# Patient Record
Sex: MALE | Race: WHITE | Employment: OTHER | URBAN - METROPOLITAN AREA
[De-identification: names, ages, dates, MRNs, and addresses within clinical notes are randomized per-mention and may not be internally consistent; named-entity substitution may affect disease eponyms.]

---

## 2020-12-27 ENCOUNTER — APPOINTMENT (OUTPATIENT)
Dept: ULTRASOUND IMAGING | Age: 50
DRG: 446 | End: 2020-12-27
Payer: OTHER GOVERNMENT

## 2020-12-27 ENCOUNTER — HOSPITAL ENCOUNTER (INPATIENT)
Age: 50
LOS: 6 days | Discharge: HOME | DRG: 446 | End: 2021-01-02
Attending: EMERGENCY MEDICINE | Admitting: INTERNAL MEDICINE
Payer: OTHER GOVERNMENT

## 2020-12-27 ENCOUNTER — APPOINTMENT (OUTPATIENT)
Dept: GENERAL RADIOLOGY | Age: 50
DRG: 446 | End: 2020-12-27
Payer: OTHER GOVERNMENT

## 2020-12-27 ENCOUNTER — APPOINTMENT (OUTPATIENT)
Dept: CT IMAGING | Age: 50
DRG: 446 | End: 2020-12-27
Payer: OTHER GOVERNMENT

## 2020-12-27 DIAGNOSIS — R11.0 NAUSEA: ICD-10-CM

## 2020-12-27 DIAGNOSIS — K80.00 CALCULUS OF GALLBLADDER WITH ACUTE CHOLECYSTITIS WITHOUT OBSTRUCTION: Primary | ICD-10-CM

## 2020-12-27 DIAGNOSIS — R74.01 TRANSAMINITIS: ICD-10-CM

## 2020-12-27 DIAGNOSIS — R10.13 ABDOMINAL PAIN, EPIGASTRIC: ICD-10-CM

## 2020-12-27 LAB
ALBUMIN SERPL-MCNC: 3.9 G/DL (ref 3.4–5)
ALP BLD-CCNC: 324 U/L (ref 40–129)
ALT SERPL-CCNC: 966 U/L (ref 10–40)
ANION GAP SERPL CALCULATED.3IONS-SCNC: 9 MMOL/L (ref 3–16)
AST SERPL-CCNC: 576 U/L (ref 15–37)
BASOPHILS ABSOLUTE: 0.1 K/UL (ref 0–0.2)
BASOPHILS RELATIVE PERCENT: 1.7 %
BILIRUB SERPL-MCNC: 1.6 MG/DL (ref 0–1)
BILIRUBIN DIRECT: 0.9 MG/DL (ref 0–0.3)
BILIRUBIN URINE: NEGATIVE
BILIRUBIN, INDIRECT: 0.7 MG/DL (ref 0–1)
BLOOD, URINE: NEGATIVE
BUN BLDV-MCNC: 13 MG/DL (ref 7–20)
CALCIUM SERPL-MCNC: 8.5 MG/DL (ref 8.3–10.6)
CHLORIDE BLD-SCNC: 99 MMOL/L (ref 99–110)
CLARITY: CLEAR
CO2: 26 MMOL/L (ref 21–32)
COLOR: YELLOW
CREAT SERPL-MCNC: 1 MG/DL (ref 0.9–1.3)
EOSINOPHILS ABSOLUTE: 0.6 K/UL (ref 0–0.6)
EOSINOPHILS RELATIVE PERCENT: 9.9 %
GFR AFRICAN AMERICAN: >60
GFR NON-AFRICAN AMERICAN: >60
GLUCOSE BLD-MCNC: 120 MG/DL (ref 70–99)
GLUCOSE BLD-MCNC: 219 MG/DL (ref 70–99)
GLUCOSE URINE: >=1000 MG/DL
HCT VFR BLD CALC: 39.1 % (ref 40.5–52.5)
HEMOGLOBIN: 13 G/DL (ref 13.5–17.5)
INR BLD: 1.12 (ref 0.86–1.14)
KETONES, URINE: NEGATIVE MG/DL
LEUKOCYTE ESTERASE, URINE: NEGATIVE
LIPASE: 69 U/L (ref 13–60)
LYMPHOCYTES ABSOLUTE: 1.8 K/UL (ref 1–5.1)
LYMPHOCYTES RELATIVE PERCENT: 29.7 %
MCH RBC QN AUTO: 30.1 PG (ref 26–34)
MCHC RBC AUTO-ENTMCNC: 33.3 G/DL (ref 31–36)
MCV RBC AUTO: 90.3 FL (ref 80–100)
MICROSCOPIC EXAMINATION: ABNORMAL
MONOCYTES ABSOLUTE: 0.5 K/UL (ref 0–1.3)
MONOCYTES RELATIVE PERCENT: 8.2 %
NEUTROPHILS ABSOLUTE: 3 K/UL (ref 1.7–7.7)
NEUTROPHILS RELATIVE PERCENT: 50.5 %
NITRITE, URINE: NEGATIVE
PDW BLD-RTO: 14.7 % (ref 12.4–15.4)
PERFORMED ON: ABNORMAL
PH UA: 6.5 (ref 5–8)
PLATELET # BLD: 331 K/UL (ref 135–450)
PMV BLD AUTO: 8.1 FL (ref 5–10.5)
POTASSIUM REFLEX MAGNESIUM: 4.1 MMOL/L (ref 3.5–5.1)
PROTEIN UA: NEGATIVE MG/DL
PROTHROMBIN TIME: 13 SEC (ref 10–13.2)
RBC # BLD: 4.33 M/UL (ref 4.2–5.9)
SARS-COV-2, NAAT: NOT DETECTED
SODIUM BLD-SCNC: 134 MMOL/L (ref 136–145)
SPECIFIC GRAVITY UA: 1.01 (ref 1–1.03)
TOTAL PROTEIN: 7.1 G/DL (ref 6.4–8.2)
TROPONIN: <0.01 NG/ML
URINE REFLEX TO CULTURE: ABNORMAL
URINE TYPE: ABNORMAL
UROBILINOGEN, URINE: 1 E.U./DL
WBC # BLD: 6 K/UL (ref 4–11)

## 2020-12-27 PROCEDURE — 81003 URINALYSIS AUTO W/O SCOPE: CPT

## 2020-12-27 PROCEDURE — 96376 TX/PRO/DX INJ SAME DRUG ADON: CPT

## 2020-12-27 PROCEDURE — 6360000002 HC RX W HCPCS: Performed by: NURSE PRACTITIONER

## 2020-12-27 PROCEDURE — 85025 COMPLETE CBC W/AUTO DIFF WBC: CPT

## 2020-12-27 PROCEDURE — 6360000002 HC RX W HCPCS: Performed by: PHYSICIAN ASSISTANT

## 2020-12-27 PROCEDURE — 96365 THER/PROPH/DIAG IV INF INIT: CPT

## 2020-12-27 PROCEDURE — 74177 CT ABD & PELVIS W/CONTRAST: CPT

## 2020-12-27 PROCEDURE — 76705 ECHO EXAM OF ABDOMEN: CPT

## 2020-12-27 PROCEDURE — 2580000003 HC RX 258: Performed by: PHYSICIAN ASSISTANT

## 2020-12-27 PROCEDURE — 93005 ELECTROCARDIOGRAM TRACING: CPT | Performed by: PHYSICIAN ASSISTANT

## 2020-12-27 PROCEDURE — 2580000003 HC RX 258: Performed by: NURSE PRACTITIONER

## 2020-12-27 PROCEDURE — 96375 TX/PRO/DX INJ NEW DRUG ADDON: CPT

## 2020-12-27 PROCEDURE — 85610 PROTHROMBIN TIME: CPT

## 2020-12-27 PROCEDURE — 84439 ASSAY OF FREE THYROXINE: CPT

## 2020-12-27 PROCEDURE — 6360000004 HC RX CONTRAST MEDICATION: Performed by: PHYSICIAN ASSISTANT

## 2020-12-27 PROCEDURE — U0002 COVID-19 LAB TEST NON-CDC: HCPCS

## 2020-12-27 PROCEDURE — 84443 ASSAY THYROID STIM HORMONE: CPT

## 2020-12-27 PROCEDURE — 74018 RADEX ABDOMEN 1 VIEW: CPT

## 2020-12-27 PROCEDURE — 99221 1ST HOSP IP/OBS SF/LOW 40: CPT | Performed by: SURGERY

## 2020-12-27 PROCEDURE — 1200000000 HC SEMI PRIVATE

## 2020-12-27 PROCEDURE — 83690 ASSAY OF LIPASE: CPT

## 2020-12-27 PROCEDURE — 80076 HEPATIC FUNCTION PANEL: CPT

## 2020-12-27 PROCEDURE — 99285 EMERGENCY DEPT VISIT HI MDM: CPT

## 2020-12-27 PROCEDURE — 80048 BASIC METABOLIC PNL TOTAL CA: CPT

## 2020-12-27 PROCEDURE — 84484 ASSAY OF TROPONIN QUANT: CPT

## 2020-12-27 RX ORDER — ONDANSETRON 2 MG/ML
4 INJECTION INTRAMUSCULAR; INTRAVENOUS ONCE
Status: COMPLETED | OUTPATIENT
Start: 2020-12-27 | End: 2020-12-27

## 2020-12-27 RX ORDER — SODIUM CHLORIDE 9 MG/ML
INJECTION, SOLUTION INTRAVENOUS CONTINUOUS
Status: DISCONTINUED | OUTPATIENT
Start: 2020-12-27 | End: 2021-01-03 | Stop reason: HOSPADM

## 2020-12-27 RX ORDER — LOSARTAN POTASSIUM 25 MG/1
75 TABLET ORAL DAILY
Status: DISCONTINUED | OUTPATIENT
Start: 2020-12-28 | End: 2021-01-03 | Stop reason: HOSPADM

## 2020-12-27 RX ORDER — NICOTINE POLACRILEX 4 MG
15 LOZENGE BUCCAL PRN
Status: DISCONTINUED | OUTPATIENT
Start: 2020-12-27 | End: 2021-01-03 | Stop reason: HOSPADM

## 2020-12-27 RX ORDER — LOSARTAN POTASSIUM 25 MG/1
75 TABLET ORAL DAILY
COMMUNITY

## 2020-12-27 RX ORDER — LEVOTHYROXINE SODIUM 0.03 MG/1
25 TABLET ORAL
Status: DISCONTINUED | OUTPATIENT
Start: 2020-12-28 | End: 2021-01-03 | Stop reason: HOSPADM

## 2020-12-27 RX ORDER — KETOROLAC TROMETHAMINE 30 MG/ML
30 INJECTION, SOLUTION INTRAMUSCULAR; INTRAVENOUS ONCE
Status: COMPLETED | OUTPATIENT
Start: 2020-12-27 | End: 2020-12-27

## 2020-12-27 RX ORDER — 0.9 % SODIUM CHLORIDE 0.9 %
1000 INTRAVENOUS SOLUTION INTRAVENOUS ONCE
Status: COMPLETED | OUTPATIENT
Start: 2020-12-27 | End: 2020-12-27

## 2020-12-27 RX ORDER — FLUOXETINE HYDROCHLORIDE 20 MG/1
20 CAPSULE ORAL 2 TIMES DAILY
Status: DISCONTINUED | OUTPATIENT
Start: 2020-12-27 | End: 2021-01-03 | Stop reason: HOSPADM

## 2020-12-27 RX ORDER — ATORVASTATIN CALCIUM 80 MG/1
120 TABLET, FILM COATED ORAL DAILY
Status: ON HOLD | COMMUNITY
End: 2021-01-02 | Stop reason: HOSPADM

## 2020-12-27 RX ORDER — ONDANSETRON 2 MG/ML
4 INJECTION INTRAMUSCULAR; INTRAVENOUS EVERY 6 HOURS PRN
Status: DISCONTINUED | OUTPATIENT
Start: 2020-12-27 | End: 2021-01-03 | Stop reason: HOSPADM

## 2020-12-27 RX ORDER — ACETAMINOPHEN 325 MG/1
650 TABLET ORAL EVERY 4 HOURS PRN
Status: DISCONTINUED | OUTPATIENT
Start: 2020-12-27 | End: 2021-01-03 | Stop reason: HOSPADM

## 2020-12-27 RX ORDER — DEXTROSE MONOHYDRATE 50 MG/ML
100 INJECTION, SOLUTION INTRAVENOUS PRN
Status: DISCONTINUED | OUTPATIENT
Start: 2020-12-27 | End: 2021-01-03 | Stop reason: HOSPADM

## 2020-12-27 RX ORDER — SODIUM CHLORIDE 0.9 % (FLUSH) 0.9 %
10 SYRINGE (ML) INJECTION EVERY 12 HOURS SCHEDULED
Status: DISCONTINUED | OUTPATIENT
Start: 2020-12-27 | End: 2021-01-03 | Stop reason: HOSPADM

## 2020-12-27 RX ORDER — SODIUM CHLORIDE 0.9 % (FLUSH) 0.9 %
10 SYRINGE (ML) INJECTION PRN
Status: DISCONTINUED | OUTPATIENT
Start: 2020-12-27 | End: 2021-01-03 | Stop reason: HOSPADM

## 2020-12-27 RX ORDER — PROMETHAZINE HYDROCHLORIDE 25 MG/1
12.5 TABLET ORAL EVERY 6 HOURS PRN
Status: DISCONTINUED | OUTPATIENT
Start: 2020-12-27 | End: 2021-01-03 | Stop reason: HOSPADM

## 2020-12-27 RX ORDER — MORPHINE SULFATE 2 MG/ML
2 INJECTION, SOLUTION INTRAMUSCULAR; INTRAVENOUS
Status: DISCONTINUED | OUTPATIENT
Start: 2020-12-27 | End: 2021-01-01

## 2020-12-27 RX ORDER — FLUOXETINE HYDROCHLORIDE 20 MG/1
20 CAPSULE ORAL 2 TIMES DAILY
COMMUNITY
End: 2021-01-22

## 2020-12-27 RX ORDER — DEXTROSE MONOHYDRATE 25 G/50ML
12.5 INJECTION, SOLUTION INTRAVENOUS PRN
Status: DISCONTINUED | OUTPATIENT
Start: 2020-12-27 | End: 2021-01-03 | Stop reason: HOSPADM

## 2020-12-27 RX ORDER — MORPHINE SULFATE 4 MG/ML
4 INJECTION, SOLUTION INTRAMUSCULAR; INTRAVENOUS ONCE
Status: COMPLETED | OUTPATIENT
Start: 2020-12-27 | End: 2020-12-27

## 2020-12-27 RX ADMIN — ONDANSETRON 4 MG: 2 INJECTION INTRAMUSCULAR; INTRAVENOUS at 18:53

## 2020-12-27 RX ADMIN — Medication 10 ML: at 23:02

## 2020-12-27 RX ADMIN — ONDANSETRON 4 MG: 2 INJECTION INTRAMUSCULAR; INTRAVENOUS at 16:03

## 2020-12-27 RX ADMIN — MORPHINE SULFATE 2 MG: 2 INJECTION, SOLUTION INTRAMUSCULAR; INTRAVENOUS at 23:03

## 2020-12-27 RX ADMIN — SODIUM CHLORIDE 1000 ML: 9 INJECTION, SOLUTION INTRAVENOUS at 16:03

## 2020-12-27 RX ADMIN — SODIUM CHLORIDE 1000 ML: 9 INJECTION, SOLUTION INTRAVENOUS at 18:44

## 2020-12-27 RX ADMIN — PIPERACILLIN AND TAZOBACTAM 3.38 G: 3; .375 INJECTION, POWDER, FOR SOLUTION INTRAVENOUS at 18:43

## 2020-12-27 RX ADMIN — SODIUM CHLORIDE: 900 INJECTION INTRAVENOUS at 23:02

## 2020-12-27 RX ADMIN — MORPHINE SULFATE 4 MG: 4 INJECTION, SOLUTION INTRAMUSCULAR; INTRAVENOUS at 18:53

## 2020-12-27 RX ADMIN — KETOROLAC TROMETHAMINE 30 MG: 30 INJECTION, SOLUTION INTRAMUSCULAR at 16:03

## 2020-12-27 RX ADMIN — IOPAMIDOL 75 ML: 755 INJECTION, SOLUTION INTRAVENOUS at 17:37

## 2020-12-27 ASSESSMENT — PAIN DESCRIPTION - PAIN TYPE: TYPE: ACUTE PAIN

## 2020-12-27 ASSESSMENT — PAIN SCALES - GENERAL
PAINLEVEL_OUTOF10: 4
PAINLEVEL_OUTOF10: 4
PAINLEVEL_OUTOF10: 8
PAINLEVEL_OUTOF10: 8

## 2020-12-27 ASSESSMENT — PAIN DESCRIPTION - LOCATION: LOCATION: ABDOMEN

## 2020-12-27 NOTE — ED PROVIDER NOTES
[address] [facility]  ED  EMERGENCY DEPARTMENT ENCOUNTER        Pt Name: Ledy Mcclellan  MRN: [id number]  [place] 1970  Date of evaluation: 12/27/2020  Provider: COLTON Mehta CNP  PCP: No primary care provider on file. I have seen and evaluated this patient with my supervising physician Matias Mancuso MD.    [address] [place] [address]       Chief Complaint   Patient presents with    Abdominal Pain     seen at urgent care in [facility], states he was sent to the [facility]. Had CT scan done. They wanted to admit patient, but he didn't want to be admitted over [place]. CT scan showed:  bilateral hydronephrosis, distended bladder, cholelithiasis, constipation, etc.     Nausea       1700 Pt care transferred to me from Gary Berg, [address] [place], [place] [address]nabeel Mckeon. Pending labs and ct scan.        DIAGNOSTIC RESULTS   LABS:    Labs Reviewed   CBC WITH AUTO DIFFERENTIAL - Abnormal; Notable for the following components:       Result Value    Hemoglobin 13.0 (*)     Hematocrit 39.1 (*)     All other components within normal limits    Narrative:     Performed at:  [place] [address] [address]   Phone [phone]   BASIC METABOLIC PANEL W/ REFLEX TO MG FOR LOW K - Abnormal; Notable for the following components:    Sodium 134 (*)     Glucose 219 (*)     All other components within normal limits    Narrative:     Performed at:  [place] [address] [address]   Phone [phone]   HEPATIC FUNCTION PANEL - Abnormal; Notable for the following components:    Alkaline Phosphatase 324 (*)      (*)      (*)     Total Bilirubin 1.6 (*)     Bilirubin, Direct 0.9 (*)     All other components within normal limits    Narrative:     Performed at:  [place] [address] [address]   Phone [phone]   LIPASE - Abnormal; Notable for the following components:    Lipase 69.0 (*)     All other components within normal limits    Narrative:     Performed at:  [address],  [place], [address]   Phone [phone]   URINE RT REFLEX TO CULTURE - Abnormal; Notable for the following components:    Glucose, Ur >=1000 (*)     All other components within normal limits    Narrative:     Performed at:  [address],  [place], [address]   Phone [phone]   TROPONIN    Narrative:     Performed at:  [address],  [place], [address]   Phone [phone]   PROTIME-INR    Narrative:     Performed at:  [address],  [place], [address]   Phone [phone]   TSH WITH REFLEX       All other labs were within normal range or not returned as of this dictation. EKG: All EKG's are interpreted by the Emergency Department Physician in the absence of a cardiologist.  Please see their note for interpretation of EKG. RADIOLOGY:   Non-plain film images such as CT, Ultrasound and MRI are read by the radiologist. Plain radiographic images are visualized and preliminarily interpreted by the ED Provider with the below findings:        Interpretation per the Radiologist below, if available at the time of this note:    [address]   Final Result   Calculus cholecystitis         CT ABDOMEN PELVIS W IV CONTRAST Additional Contrast? None   Final Result   1. Cholelithiasis with cholecystitis   2. Appearance of the urinary bladder may be due to cystitis and/or chronic   bladder outlet obstruction. Correlate with urinalysis         XR ABDOMEN (KUB) (SINGLE AP VIEW)   Final Result   Moderate retained colonic stool as outlined above. No small bowel distension.            Xr Abdomen (kub) (single Ap View)    Result Date: 12/27/2020  EXAMINATION: ONE SUPINE XRAY VIEW(S) OF THE Imaging studies reviewed. (See chart for details)   -  Patient seen and evaluated in the emergency department. -  Triage and nursing notes reviewed and incorporated. -  Old chart records reviewed and incorporated. -  Patient case discussed with attending physician,  Dr. Josy Flaherty. They saw and examined patient. -  Differential diagnosis includes:  Cholecystitis, pancreatitis, cholelithiasis, choledocholithiasis, UTI, pyelonephritis, cystitis, hepatitis, cirrhosis, versus COVID-19  -  Work-up included:  See above CBC, BMP, hepatic function panel, lipase, troponin, INR, UA, TSH, x-ray KUB, CT abdomen pelvis with IV contrast, ultrasound right upper quadrant gallbladder, EKG  -  ED treatment included:  normal Saline, Toradol, Zofran, morphine, zosyn  - Consults: General surgery, Dr. Holland Sunshine, who request patient to be started on IV antibiotics in the ED and to be admitted to the hospitalist as he would also need urology consult. Hospitalist Edwin  -  Results discussed with patient. Labs show  CBC with hemoglobin 13, hematocrit 39.1. BMP with sodium 134, glucose 219. Hepatic function panel with alk phos 324, , , total bili 1.6, bili direct 0.9. Lipase 69. Troponin negative. INR 1.12, pro time 13. X-ray KUB shows moderate retained colonic stool as outlined above. No small bowel distention. UA with greater than thousand glucose. CT abdomen pelvis with IV contrast shows cholelithiasis with cholecystitis. appreanace of the urinary bladder may be due to cystitis and/or chronic bladder outlet obstruction. Correlate with urinalysis. The patient is agreeable with plan of care and disposition  -  Disposition:   Admission    CRITICAL CARE TIME   Total Critical Care time was 35 minutes, excluding separately reportable procedures. There was a high probability of clinically significant/life threatening deterioration in the patient's condition which required my urgent intervention.        FINAL IMPRESSION      1. Calculus of gallbladder with acute cholecystitis without obstruction    2. Transaminitis    3. Nausea    4.  Abdominal pain, epigastric          DISPOSITION/PLAN   DISPOSITION    Admission         (Please note that portions of this note were completed with a voice recognition program.  Efforts were made to edit the dictations but occasionally words are mis-transcribed.)    COLTON Lynn CNP (electronically signed)            COLTON yLnn CNP  12/27/20 2010

## 2020-12-27 NOTE — ED PROVIDER NOTES
EKG interpretation:    Normal sinus rhythm, rate 64, no acute ST changes or T wave inversions. Leftward axis, QTC mildly prolonged. No priors available for comparison. Cayla Bruno MD  12/27/20 0292    I independently performed a history and physical on Sigrid Adams. All diagnostic, treatment, and disposition decisions were made by myself in conjunction with the advanced practice provider. Patient with right upper quadrant symptoms for over 5 days. Was seen at another facility and admission was recommended however he declined to stay because he wanted to be home for [place]. Patient is now jaundiced and has elevation of his LFTs. Imaging here demonstrates cholelithiasis with cholecystitis. Gallbladder ultrasound pending. Plan for admission and antibiotics. For further details of [facility] emergency department encounter, please see ARUNA Peres's documentation.      Cayla Bruno MD  12/27/20 4313

## 2020-12-27 NOTE — ED PROVIDER NOTES
[address]        Pt Name: Rebekah Meneses  MRN: [id number]  [place] 1970  Date of evaluation: 12/27/2020  Provider: Rodrick Ndiaye PA-C  PCP: No primary care provider on file. HIRAL. I have evaluated this patient. My supervising physician was available for consultation. Loretta Gaxiola MD      CHIEF COMPLAINT       Chief Complaint   Patient presents with    Abdominal Pain     seen at urgent care in [facility], states he was sent to the [facility]. Had CT scan done. They wanted to admit patient, but he didn't want to be admitted over [place]. CT scan showed:  bilateral hydronephrosis, distended bladder, cholelithiasis, constipation, etc.     Nausea       HISTORY OF PRESENT ILLNESS   (Location, Timing/Onset, Context/Setting, Quality, Duration, Modifying Factors, Severity, Associated Signs and Symptoms)  Note limiting factors. Rebekah Meneses is a 52 y.o. male patient presenting with complaint epigastric and right upper quadrant abdominal pain. Onset 3 months ago with steady progression. Patient reports constipated feeling any self-administered magnesium citrate yesterday with a BM as well occurring. The patient was seen at [facility] ER facility on December 23, 2020. At that time he had same complaint as noted above. He underwent CT imaging of the abdomen pelvis. It does result indicating moderate-advanced bilateral hydronephrosis and hydroureter. Distended thick-walled bladder. Cholelithiasis. Fatty liver infiltrate or other hepatocellular disease. Stranding and fluid near the pancreatic head and gallbladder fossa. Suspect potential pancreatitis or gallbladder pathology or hepatitis/hepatocellular disease. Constipation noted. The patient also reports having been seen at [place] [address] and they did not come up with a specific or working diagnosis.     The patient comes in today as they had wanted him admitted to the [facility] facility and he had left and is come out to this facility for evaluation and admission if needed. Nursing Notes were all reviewed and agreed with or any disagreements were addressed in the HPI. REVIEW OF SYSTEMS    (2-9 systems for level 4, 10 or more for level 5)     Review of Systems    Positives and Pertinent negatives as per HPI. Except as noted above in the ROS, all other systems were reviewed and negative. PAST MEDICAL HISTORY     Past Medical History:   Diagnosis Date    Diabetes mellitus ([facility] [address]     Hypercholesteremia     Hypertension     Thyroid cancer ([facility]          SURGICAL HISTORY     Past Surgical History:   Procedure Laterality Date    APPENDECTOMY      TOTAL THYROIDECTOMY  2017         [address]       Current Discharge Medication List      CONTINUE these medications which have NOT CHANGED    Details   metFORMIN (GLUCOPHAGE) 500 MG tablet Take 1,000 mg by mouth 2 times daily (with meals)      Levothyroxine Sodium (SYNTHROID PO) Take 25 mcg by mouth every morning (before breakfast)      losartan (COZAAR) 25 MG tablet Take 75 mg by mouth daily      FLUoxetine (PROZAC) 20 MG capsule Take 20 mg by mouth 2 times daily      atorvastatin (LIPITOR) 80 MG tablet Take 120 mg by mouth daily               ALLERGIES     Patient has no known allergies. FAMILYHISTORY     History reviewed. No pertinent family history.        SOCIAL HISTORY       Social History     Tobacco Use    Smoking status: Former Smoker    Smokeless tobacco: Never Used    Tobacco comment: QUITE 15 YEARS AGO   Substance Use Topics    Alcohol use: Not Currently     Comment: quit 6 years ago    Drug use: Never       SCREENINGS    [place] Coma Scale  Eye Opening: Spontaneous  Best Verbal Response: Oriented  Best Motor Response: Obeys commands  Eliud Coma Scale Score: 15        PHYSICAL EXAM    (up to 7 for level 4, 8 or more for level 5)     ED Triage Vitals   BP Temp Temp Source Pulse Resp SpO2 Height Weight   12/27/20 1504 12/27/20 1504 12/27/20 1504 12/27/20 1500 12/27/20 1504 12/27/20 1500 12/27/20 1504 12/27/20 1504   (!) 148/104 96.5 °F (35.8 °C) Temporal 89 16 99 % 5' 8\" (1.727 m) 185 lb (83.9 kg)       Physical Exam  Vitals signs and nursing note reviewed. Constitutional:       Appearance: Normal appearance. He is well-developed and normal weight. HENT:      Head: Normocephalic and atraumatic. Right Ear: External ear normal.      Left Ear: External ear normal.   Eyes:      General: No scleral icterus. Right eye: No discharge. Left eye: No discharge. Conjunctiva/sclera: Conjunctivae normal.   Neck:      Musculoskeletal: Normal range of motion and neck supple. Cardiovascular:      Rate and Rhythm: Normal rate and regular rhythm. Heart sounds: Normal heart sounds. Pulmonary:      Effort: Pulmonary effort is normal.      Breath sounds: Normal breath sounds. Abdominal:      General: Abdomen is flat. Bowel sounds are normal.      Palpations: Abdomen is soft. Tenderness: There is abdominal tenderness. There is no right CVA tenderness or left CVA tenderness. Comments: The patient with tenderness epigastric and right upper quadrant. Negative Foote's. No rebound, mass or guarding. The abdomen is soft. No CVA tenderness. Musculoskeletal: Normal range of motion. Right lower leg: No edema. Left lower leg: No edema. Skin:     General: Skin is warm and dry. Neurological:      General: No focal deficit present. Mental Status: He is alert and oriented to person, place, and time. Mental status is at baseline. Psychiatric:         Mood and Affect: Mood normal.         Behavior: Behavior normal.         Thought Content:  Thought content normal.         Judgment: Judgment normal.         DIAGNOSTIC RESULTS   LABS:    Labs Reviewed   CBC WITH AUTO DIFFERENTIAL - Abnormal; Notable for the following components:       Result Value    Hemoglobin 13.0 (*) components:    RBC 4.18 (*)     Hemoglobin 12.6 (*)     Hematocrit 37.6 (*)     All other components within normal limits    Narrative:     Performed at:  [address], [place]   Phone [phone]   POCT GLUCOSE - Abnormal; Notable for the following components:    POC Glucose 120 (*)     All other components within normal limits    Narrative:     Performed at:  [address], [place]   Phone [phone]   TROPONIN    Narrative:     Performed at:  [address], [facility] [place]   Phone [phone]   PROTIME-INR    Narrative:     Performed at:  [address], [place]   Phone [phone]    Narrative:     Performed at:  [address], [place]   Phone [phone]   LIPASE    Narrative:     Performed at:  [address], [place]   Phone [phone]   TSH WITH REFLEX   HEMOGLOBIN A1C   POCT GLUCOSE    Narrative:     Performed at:  [place] [address]   Phone [phone]   POCT GLUCOSE   POCT GLUCOSE   POCT GLUCOSE   POCT GLUCOSE       All other labs were within normal range or not returned as of this dictation. EKG: All EKG's are interpreted by the Emergency Department Physician in the absence of a cardiologist.  Please see their note for interpretation of EKG.       RADIOLOGY:   Non-plain film images such as CT, Ultrasound and MRI are read by the radiologist. Plain radiographic images are visualized and preliminarily interpreted by the ED Provider with the below findings:        Interpretation per the Radiologist below, if available at the time of this note:    [address] GALL[address]ADDER RUQ   Final Result   Calculus cholecystitis         CT ABDOMEN PELVIS W IV CONTRAST Additional Contrast? None   Final Result   1. Cholelithiasis with cholecystitis   2. Appearance of the urinary bladder may be due to cystitis and/or chronic   bladder outlet obstruction. Correlate with urinalysis         XR ABDOMEN (KUB) (SINGLE AP VIEW)   Final Result   Moderate retained colonic stool as outlined above. No small bowel distension. No results found.         PROCEDURES   Unless otherwise noted below, none     Procedures    CRITICAL CARE TIME   N/A    CONSULTS:  IP CONSULT TO GENERAL SURGERY  IP CONSULT TO HOSPITALIST  IP CONSULT TO GENERAL SURGERY  IP CONSULT TO GI      EMERGENCY DEPARTMENT COURSE and DIFFERENTIAL DIAGNOSIS/MDM:   Vitals:    Vitals:    12/27/20 1847 12/27/20 2200 12/28/20 0030 12/28/20 0800   BP: (!) 154/106 (!) 162/101 (!) 149/96 (!) 142/90   Pulse: 82 64 58 52   Resp: 18 16 16 16   Temp:  98.5 °F (36.9 °C) 98.7 °F (37.1 °C) 97.5 °F (36.4 °C)   TempSrc:  Oral Oral Oral   SpO2: 99% 98% 97% 96%   Weight:  189 lb 6.4 oz (85.9 kg)     Height:  5' 8\" (1.727 m)         Patient was given the following medications:  Medications   FLUoxetine (PROZAC) capsule 20 mg (20 mg Oral Not Given 12/27/20 2300)   levothyroxine (SYNTHROID) tablet 25 mcg (25 mcg Oral Given 12/28/20 0557)   losartan (COZAAR) tablet 75 mg (has no administration in time range)   0.9 % sodium chloride infusion ( Intravenous New Bag 12/27/20 2302)   sodium chloride flush 0.9 % injection 10 mL (10 mLs Intravenous Given 12/27/20 2302)   sodium chloride flush 0.9 % injection 10 mL (has no administration in time range)   acetaminophen (TYLENOL) tablet 650 mg (has no administration in time range)   promethazine (PHENERGAN) tablet 12.5 mg (has no administration in time range)     Or   ondansetron (ZOFRAN) injection 4 mg (has no administration in time range)   enoxaparin (LOVENOX) injection 40 mg (has no administration in time range)   morphine (PF) injection 2 mg (2 mg Intravenous Given 12/28/20 0557)   glucose (GLUTOSE) 40 % oral gel 15 g (has no administration in time range)   dextrose 50 % IV solution (has no administration in time range)   glucagon (rDNA) injection 1 mg (has no administration in time range)   dextrose 5 % solution (has no administration in time range)   insulin lispro (HUMALOG) injection vial 0-12 Units (has no administration in time range)   insulin lispro (HUMALOG) injection vial 0-6 Units (0 Units Subcutaneous Not Given 12/27/20 2257)   piperacillin-tazobactam (ZOSYN) 3.375 g in dextrose 5 % 50 mL IVPB (mini-bag) (0 g Intravenous Stopped 12/28/20 0628)   0.9 % sodium chloride bolus (0 mLs Intravenous Stopped 12/27/20 1703)   ondansetron (ZOFRAN) injection 4 mg (4 mg Intravenous Given 12/27/20 1603)   ketorolac (TORADOL) injection 30 mg (30 mg Intravenous Given 12/27/20 1603)   iopamidol (ISOVUE-370) 76 % injection 75 mL (75 mLs Intravenous Given 12/27/20 1737)   0.9 % sodium chloride bolus (0 mLs Intravenous Stopped 12/27/20 1944)   piperacillin-tazobactam (ZOSYN) 3.375 g in dextrose 5 % 50 mL IVPB (mini-bag) (0 g Intravenous Stopped 12/27/20 1913)   morphine (PF) injection 4 mg (4 mg Intravenous Given 12/27/20 1853)   ondansetron (ZOFRAN) injection 4 mg (4 mg Intravenous Given 12/27/20 1853)         5 PM I discussed case with coworker. She will manage case forward and final disposition. FINAL IMPRESSION      1. Calculus of gallbladder with acute cholecystitis without obstruction    2. Transaminitis    3. Nausea    4. Abdominal pain, epigastric          DISPOSITION/PLAN   DISPOSITION        PATIENT REFERREDTO:  No follow-up provider specified.     DISCHARGE MEDICATIONS:  Current Discharge Medication List          DISCONTINUED MEDICATIONS:  Current Discharge Medication List                 (Please note that portions of this note were completed with a voice recognition program.  Efforts were made to edit the dictations but occasionally words are mis-transcribed. )    Carolina Claros PA-C (electronically signed)           Carolina Claros PA-C  12/28/20 4281

## 2020-12-28 LAB
A/G RATIO: 1.2 (ref 1.1–2.2)
ALBUMIN SERPL-MCNC: 3.6 G/DL (ref 3.4–5)
ALP BLD-CCNC: 292 U/L (ref 40–129)
ALT SERPL-CCNC: 859 U/L (ref 10–40)
ANION GAP SERPL CALCULATED.3IONS-SCNC: 7 MMOL/L (ref 3–16)
AST SERPL-CCNC: 567 U/L (ref 15–37)
BASOPHILS ABSOLUTE: 0.1 K/UL (ref 0–0.2)
BASOPHILS RELATIVE PERCENT: 1.5 %
BILIRUB SERPL-MCNC: 1.6 MG/DL (ref 0–1)
BUN BLDV-MCNC: 11 MG/DL (ref 7–20)
CALCIUM SERPL-MCNC: 8.1 MG/DL (ref 8.3–10.6)
CHLORIDE BLD-SCNC: 103 MMOL/L (ref 99–110)
CO2: 28 MMOL/L (ref 21–32)
CREAT SERPL-MCNC: 1 MG/DL (ref 0.9–1.3)
EKG ATRIAL RATE: 64 BPM
EKG DIAGNOSIS: NORMAL
EKG P AXIS: 57 DEGREES
EKG P-R INTERVAL: 166 MS
EKG Q-T INTERVAL: 446 MS
EKG QRS DURATION: 104 MS
EKG QTC CALCULATION (BAZETT): 460 MS
EKG R AXIS: -16 DEGREES
EKG T AXIS: 39 DEGREES
EKG VENTRICULAR RATE: 64 BPM
EOSINOPHILS ABSOLUTE: 0.5 K/UL (ref 0–0.6)
EOSINOPHILS RELATIVE PERCENT: 7.5 %
GFR AFRICAN AMERICAN: >60
GFR NON-AFRICAN AMERICAN: >60
GLOBULIN: 3 G/DL
GLUCOSE BLD-MCNC: 163 MG/DL (ref 70–99)
GLUCOSE BLD-MCNC: 303 MG/DL (ref 70–99)
GLUCOSE BLD-MCNC: 360 MG/DL (ref 70–99)
GLUCOSE BLD-MCNC: 374 MG/DL (ref 70–99)
GLUCOSE BLD-MCNC: 382 MG/DL (ref 70–99)
GLUCOSE BLD-MCNC: 86 MG/DL (ref 70–99)
GLUCOSE BLD-MCNC: 95 MG/DL (ref 70–99)
HCT VFR BLD CALC: 37.6 % (ref 40.5–52.5)
HEMOGLOBIN: 12.6 G/DL (ref 13.5–17.5)
LIPASE: 35 U/L (ref 13–60)
LYMPHOCYTES ABSOLUTE: 1.7 K/UL (ref 1–5.1)
LYMPHOCYTES RELATIVE PERCENT: 25.5 %
MCH RBC QN AUTO: 30.1 PG (ref 26–34)
MCHC RBC AUTO-ENTMCNC: 33.4 G/DL (ref 31–36)
MCV RBC AUTO: 90 FL (ref 80–100)
MONOCYTES ABSOLUTE: 0.5 K/UL (ref 0–1.3)
MONOCYTES RELATIVE PERCENT: 7.9 %
NEUTROPHILS ABSOLUTE: 3.7 K/UL (ref 1.7–7.7)
NEUTROPHILS RELATIVE PERCENT: 57.6 %
PDW BLD-RTO: 15 % (ref 12.4–15.4)
PERFORMED ON: ABNORMAL
PERFORMED ON: NORMAL
PLATELET # BLD: 317 K/UL (ref 135–450)
PMV BLD AUTO: 7.8 FL (ref 5–10.5)
POTASSIUM REFLEX MAGNESIUM: 4.1 MMOL/L (ref 3.5–5.1)
RBC # BLD: 4.18 M/UL (ref 4.2–5.9)
SODIUM BLD-SCNC: 138 MMOL/L (ref 136–145)
T4 FREE: 0.3 NG/DL (ref 0.9–1.8)
TOTAL PROTEIN: 6.6 G/DL (ref 6.4–8.2)
TSH REFLEX: 45.77 UIU/ML (ref 0.27–4.2)
WBC # BLD: 6.5 K/UL (ref 4–11)

## 2020-12-28 PROCEDURE — 86708 HEPATITIS A ANTIBODY: CPT

## 2020-12-28 PROCEDURE — 6370000000 HC RX 637 (ALT 250 FOR IP): Performed by: NURSE PRACTITIONER

## 2020-12-28 PROCEDURE — 87340 HEPATITIS B SURFACE AG IA: CPT

## 2020-12-28 PROCEDURE — 86704 HEP B CORE ANTIBODY TOTAL: CPT

## 2020-12-28 PROCEDURE — 83036 HEMOGLOBIN GLYCOSYLATED A1C: CPT

## 2020-12-28 PROCEDURE — 86706 HEP B SURFACE ANTIBODY: CPT

## 2020-12-28 PROCEDURE — 93010 ELECTROCARDIOGRAM REPORT: CPT | Performed by: INTERNAL MEDICINE

## 2020-12-28 PROCEDURE — 2580000003 HC RX 258: Performed by: NURSE PRACTITIONER

## 2020-12-28 PROCEDURE — 86705 HEP B CORE ANTIBODY IGM: CPT

## 2020-12-28 PROCEDURE — 83690 ASSAY OF LIPASE: CPT

## 2020-12-28 PROCEDURE — 85025 COMPLETE CBC W/AUTO DIFF WBC: CPT

## 2020-12-28 PROCEDURE — 86709 HEPATITIS A IGM ANTIBODY: CPT

## 2020-12-28 PROCEDURE — 86803 HEPATITIS C AB TEST: CPT

## 2020-12-28 PROCEDURE — 99221 1ST HOSP IP/OBS SF/LOW 40: CPT | Performed by: INTERNAL MEDICINE

## 2020-12-28 PROCEDURE — 6360000002 HC RX W HCPCS: Performed by: NURSE PRACTITIONER

## 2020-12-28 PROCEDURE — 36415 COLL VENOUS BLD VENIPUNCTURE: CPT

## 2020-12-28 PROCEDURE — 80053 COMPREHEN METABOLIC PANEL: CPT

## 2020-12-28 PROCEDURE — 99232 SBSQ HOSP IP/OBS MODERATE 35: CPT | Performed by: SURGERY

## 2020-12-28 PROCEDURE — 1200000000 HC SEMI PRIVATE

## 2020-12-28 RX ORDER — OXYCODONE HYDROCHLORIDE 5 MG/1
5 TABLET ORAL EVERY 4 HOURS PRN
Status: DISCONTINUED | OUTPATIENT
Start: 2020-12-28 | End: 2021-01-01

## 2020-12-28 RX ADMIN — LOSARTAN POTASSIUM 75 MG: 25 TABLET, FILM COATED ORAL at 09:11

## 2020-12-28 RX ADMIN — PIPERACILLIN AND TAZOBACTAM 3.38 G: 3; .375 INJECTION, POWDER, FOR SOLUTION INTRAVENOUS at 18:14

## 2020-12-28 RX ADMIN — Medication 10 ML: at 13:02

## 2020-12-28 RX ADMIN — FLUOXETINE HYDROCHLORIDE 20 MG: 20 CAPSULE ORAL at 09:10

## 2020-12-28 RX ADMIN — MORPHINE SULFATE 2 MG: 2 INJECTION, SOLUTION INTRAMUSCULAR; INTRAVENOUS at 05:57

## 2020-12-28 RX ADMIN — Medication 10 ML: at 20:24

## 2020-12-28 RX ADMIN — MORPHINE SULFATE 2 MG: 2 INJECTION, SOLUTION INTRAMUSCULAR; INTRAVENOUS at 20:24

## 2020-12-28 RX ADMIN — OXYCODONE 5 MG: 5 TABLET ORAL at 23:36

## 2020-12-28 RX ADMIN — LEVOTHYROXINE SODIUM 25 MCG: 0.03 TABLET ORAL at 05:57

## 2020-12-28 RX ADMIN — FLUOXETINE HYDROCHLORIDE 20 MG: 20 CAPSULE ORAL at 20:24

## 2020-12-28 RX ADMIN — PIPERACILLIN AND TAZOBACTAM 3.38 G: 3; .375 INJECTION, POWDER, FOR SOLUTION INTRAVENOUS at 10:31

## 2020-12-28 RX ADMIN — MORPHINE SULFATE 2 MG: 2 INJECTION, SOLUTION INTRAMUSCULAR; INTRAVENOUS at 13:02

## 2020-12-28 RX ADMIN — MORPHINE SULFATE 2 MG: 2 INJECTION, SOLUTION INTRAMUSCULAR; INTRAVENOUS at 02:27

## 2020-12-28 RX ADMIN — INSULIN LISPRO 5 UNITS: 100 INJECTION, SOLUTION INTRAVENOUS; SUBCUTANEOUS at 21:13

## 2020-12-28 RX ADMIN — INSULIN LISPRO 8 UNITS: 100 INJECTION, SOLUTION INTRAVENOUS; SUBCUTANEOUS at 18:15

## 2020-12-28 RX ADMIN — MORPHINE SULFATE 2 MG: 2 INJECTION, SOLUTION INTRAMUSCULAR; INTRAVENOUS at 09:11

## 2020-12-28 RX ADMIN — MORPHINE SULFATE 2 MG: 2 INJECTION, SOLUTION INTRAMUSCULAR; INTRAVENOUS at 16:16

## 2020-12-28 RX ADMIN — PIPERACILLIN AND TAZOBACTAM 3.38 G: 3; .375 INJECTION, POWDER, FOR SOLUTION INTRAVENOUS at 02:27

## 2020-12-28 RX ADMIN — ENOXAPARIN SODIUM 40 MG: 40 INJECTION SUBCUTANEOUS at 09:10

## 2020-12-28 ASSESSMENT — PAIN SCALES - GENERAL
PAINLEVEL_OUTOF10: 5
PAINLEVEL_OUTOF10: 8
PAINLEVEL_OUTOF10: 7
PAINLEVEL_OUTOF10: 4
PAINLEVEL_OUTOF10: 8

## 2020-12-28 ASSESSMENT — PAIN DESCRIPTION - LOCATION: LOCATION: ABDOMEN

## 2020-12-28 NOTE — PROGRESS NOTES
Hospitalist Progress Note      PCP: No primary care provider on file. Date of Admission: 12/27/2020    Chief Complaint: Abdominal Pain    Subjective: no new c/o. Medications:  Reviewed    Infusion Medications    sodium chloride 100 mL/hr at 12/27/20 2302    dextrose       Scheduled Medications    piperacillin-tazobactam  3.375 g Intravenous Q8H    FLUoxetine  20 mg Oral BID    levothyroxine  25 mcg Oral QAM AC    losartan  75 mg Oral Daily    sodium chloride flush  10 mL Intravenous 2 times per day    enoxaparin  40 mg Subcutaneous Daily    insulin lispro  0-12 Units Subcutaneous TID WC    insulin lispro  0-6 Units Subcutaneous Nightly     PRN Meds: sodium chloride flush, acetaminophen, promethazine **OR** ondansetron, morphine, glucose, dextrose, glucagon (rDNA), dextrose      Intake/Output Summary (Last 24 hours) at 12/28/2020 1017  Last data filed at 12/27/2020 1944  Gross per 24 hour   Intake 2050 ml   Output --   Net 2050 ml       Physical Exam Performed:    BP (!) 142/90   Pulse 52   Temp 97.5 °F (36.4 °C) (Oral)   Resp 16   Ht 5' 8\" (1.727 m)   Wt 189 lb 6.4 oz (85.9 kg)   SpO2 96%   BMI 28.80 kg/m²     General appearance: No apparent distress, appears stated age and cooperative. HEENT: Pupils equal, round, and reactive to light. Conjunctivae/corneas clear. Neck: Supple, with full range of motion. No jugular venous distention. Trachea midline. Respiratory:  Normal respiratory effort. Clear to auscultation, bilaterally without Rales/Wheezes/Rhonchi. Cardiovascular: Regular rate and rhythm with normal S1/S2 without murmurs, rubs or gallops. Abdomen: Soft, non-tender, non-distended with normal bowel sounds. Musculoskeletal: No clubbing, cyanosis or edema bilaterally. Full range of motion without deformity. Skin: Skin color, texture, turgor normal.  No rashes or lesions. Neurologic:  Neurovascularly intact without any focal sensory/motor deficits.  Cranial nerves: II-XII intact, grossly non-focal.  Psychiatric: Alert and oriented, thought content appropriate, normal insight  Capillary Refill: Brisk,< 3 seconds   Peripheral Pulses: +2 palpable, equal bilaterally       Labs:   Recent Labs     12/27/20  1605 12/28/20  0749   WBC 6.0 6.5   HGB 13.0* 12.6*   HCT 39.1* 37.6*    317     Recent Labs     12/27/20  1605 12/28/20  0749   * 138   K 4.1 4.1   CL 99 103   CO2 26 28   BUN 13 11   CREATININE 1.0 1.0   CALCIUM 8.5 8.1*     Recent Labs     12/27/20  1605 12/28/20  0749   * 567*   * 859*   BILIDIR 0.9*  --    BILITOT 1.6* 1.6*   ALKPHOS 324* 292*     Recent Labs     12/27/20  1605   INR 1.12     Recent Labs     12/27/20  1605   TROPONINI <0.01       Urinalysis:      Lab Results   Component Value Date    NITRU Negative 12/27/2020    BLOODU Negative 12/27/2020    SPECGRAV 1.015 12/27/2020    GLUCOSEU >=1000 12/27/2020       Consults:    IP CONSULT TO GENERAL SURGERY  IP CONSULT TO HOSPITALIST  IP CONSULT TO GENERAL SURGERY  IP CONSULT TO GI      Assessment/Plan:    Active Hospital Problems    Diagnosis    Transaminitis [R74.01]    Abdominal pain, epigastric [R10.13]    Calculus of gallbladder with acute cholecystitis without obstruction [K80.00]       Acute calculus cholecystitis - CT abdomen pelvis revealed Cholelithiasis with cholecystitis. Possible cystitis and/or chronic bladder outlet obstruction.  - Right upper quadrant gallbladder ultrasound revealed calculus cholecystitis  - General surgery was consulted from ED and appreciated. GI consulted and appreciated. Started on empiric Zosyn in ED 27 Dec - continued.      Abnormal LFTs 2/2 above  - Monitor with CMP  - Avoid hepatotoxins as able     HTN - w/out known CAD and no evidence of active signs/sxs of ischemia/failure. Currently controlled on home meds w/ vitals reviewed and documented as above. HyperLipidemia - controlled on home Statin. Held w/ elevated LFTs.   Continue f/u and med adjustment w/ PCP    DM2 - controlled on home oral antiGlycemics - held. Follow FSBS/SSI medium regimen. Last HbA1c N/A. Anticipate resuming/continuing home regimen at discharge.         DVT Prophylaxis: LMWH/IPC  Diet: DIET CLEAR LIQUID;  Code Status: Full Code      PT/OT Eval Status: not yet ordered. Dispo - possibly Wed/Thurs 30/31 Dec pending clinical course and subspecialty recs.      Veronique Knowles MD

## 2020-12-28 NOTE — ED NOTES
PS General Surgery @ 7441  RE: cholecystitis with cholelithiasis, elevated LFTs per JULIUS Valdez Dr. called back @ 1928       Meti Eliudu  12/27/20 1936

## 2020-12-28 NOTE — PROGRESS NOTES
[facility]    PATIENT NAME: Micki Michelle     TODAY'S DATE: 12/28/2020    CHIEF COMPLAINT: Abdominal pain    INTERVAL HISTORY/HPI:    Pt still with pain, but states he feels better today. He denies nausea or vomiting. REVIEW OF SYSTEMS:  Pertinent positives and negatives as per interval history section    OBJECTIVE:  VITALS:  BP (!) 142/90   Pulse 52   Temp 97.5 °F (36.4 °C) (Oral)   Resp 16   Ht 5' 8\" (1.727 m)   Wt 189 lb 6.4 oz (85.9 kg)   SpO2 96%   BMI 28.80 kg/m²     INTAKE/OUTPUT:    I/O last 3 completed shifts: In: 2050 [IV Piggyback:2050]  Out: -   No intake/output data recorded. CONSTITUTIONAL:  awake and alert  LUNGS:  Respirations easy and unlabored, clear to auscultation  CARD:  bradycardic with regular rhythm  ABDOMEN:  normal bowel sounds, soft, non-distended, mild tenderness noted in the right upper quadrant Foote's sign is absent     Data:  CBC:   Recent Labs     12/27/20  1605 12/28/20  0749   WBC 6.0 6.5   HGB 13.0* 12.6*   HCT 39.1* 37.6*    317     BMP:    Recent Labs     12/27/20  1605 12/28/20  0749   * 138   K 4.1 4.1   CL 99 103   CO2 26 28   BUN 13 11   CREATININE 1.0 1.0   GLUCOSE 219* 95     Hepatic:   Recent Labs     12/27/20  1605 12/28/20  0749   * 567*   * 859*   BILITOT 1.6* 1.6*   ALKPHOS 324* 292*     Mag:    No results for input(s): MG in the last 72 hours. Phos:   No results for input(s): PHOS in the last 72 hours. INR:   Recent Labs     12/27/20  1605   INR 1.12       Radiology Review:  *Imaging personally reviewed by me. N/A      ASSESSMENT AND PLAN:  Acute calculus cholecystitis. Clinically improved today and liver enzymes are improved as well. Start clear liquid diet.   If he tolerates a diet, he can be discharged on oral antibiotics with follow-up for interval cholecystectomy     Electronically signed by Luisito Fontaine MD     [id number]

## 2020-12-28 NOTE — ED NOTES
[id number]  RE: admission for cholecystitis with cholelithiasis per JULIUS Pollock NP called back @ 2007       [facility]  12/27/20 2016

## 2020-12-28 NOTE — CONSULTS
Department of General Surgery Consult    PATIENT NAME: Tammy Mendez   YOB: 1970    ADMISSION DATE: 12/27/2020  3:10 PM      TODAY'S DATE: 12/27/2020    Reason for Consult: Cholecystitis    Chief Complaint: Abdominal pain    Requesting Physician:  Corinna Sellers    HISTORY OF PRESENT ILLNESS:              The patient is a 52 y.o. male who presents with abdominal pain. He states this started on Wednesday. He was seen at [facility] and advised to be admitted. However, he did not want to be in the hospital over Christmas. He went home and has had persistent abdominal pain. He has had some nausea as well but but not vomiting. He denies fever or chills. He states the pain is a sharp stabbing pain in the right upper quadrant that radiates around to his back. He has not had fever or chills    Past Medical History:        Diagnosis Date    Diabetes mellitus ([facility] [address]     Hypercholesteremia     Hypertension     Thyroid cancer ([facility] [address]        Past Surgical History:        Procedure Laterality Date    APPENDECTOMY      TOTAL THYROIDECTOMY  2017       Current Medications:   No current facility-administered medications for this encounter. Prior to Admission medications    Medication Sig Start Date End Date Taking? Authorizing Provider   metFORMIN (GLUCOPHAGE) 500 MG tablet Take 1,000 mg by mouth 2 times daily (with meals)   Yes Historical Provider, MD   Levothyroxine Sodium (SYNTHROID PO) Take 25 mcg by mouth every morning (before breakfast)   Yes Historical Provider, MD   losartan (COZAAR) 25 MG tablet Take 75 mg by mouth daily   Yes Historical Provider, MD   FLUoxetine (PROZAC) 20 MG capsule Take 20 mg by mouth 2 times daily   Yes Historical Provider, MD   atorvastatin (LIPITOR) 80 MG tablet Take 120 mg by mouth daily   Yes Historical Provider, MD        Allergies:  Patient has no known allergies. Social History:   TOBACCO:   reports that he has never smoked.  He has never used smokeless tobacco.  ETOH:   reports previous alcohol use. DRUGS:   reports no history of drug use. Family History:    History reviewed. No pertinent family history. REVIEW OF SYSTEMS:  CONSTITUTIONAL:  negative  HEENT:  negative  RESPIRATORY:  negative  CARDIOVASCULAR:  negative  GASTROINTESTINAL:  negative except for nausea and abdominal pain  GENITOURINARY:  negative  HEMATOLOGIC/LYMPHATIC:  negative  NEUROLOGICAL:  Negative  * All other ROS reviewed and negative. PHYSICAL EXAM:  VITALS:  BP (!) 154/106   Pulse 82   Temp 96.5 °F (35.8 °C) (Temporal)   Resp 18   Ht 5' 8\" (1.727 m)   Wt 185 lb (83.9 kg)   SpO2 99%   BMI 28.13 kg/m²   24HR INTAKE/OUTPUT:    No intake/output data recorded.   I/O this shift:  In: 2050 [IV Piggyback:2050]  Out: -     CONSTITUTIONAL:  alert, no apparent distress and normal weight  EYES:  PERRL, sclera clear  ENT:  Normocephalic,atraumatic, without obvious abnormality  NECK:  supple, symmetrical, trachea midline  LUNGS: Resp effort easy and unlabored, clear to auscultation  CARDIOVASCULAR:  NO JVD, regular rate and rhythm and no murmur noted  ABDOMEN:  no scars, normal bowel sounds, soft, non-distended, tenderness noted in the right upper quadrant Foote's sign is absent, voluntary guarding absent, no masses palpated and hernia absent  MUSCULOSKELETAL: No clubbing or cyanosis, 0+ pitting edema lower extremities  NEUROLOGIC:  Mental Status Exam:  Level of Alertness:   awake  PSYCHIATRIC:   person, place, time  SKIN:  no bruising or bleeding, normal skin color, texture, turgor and no redness, warmth, or swelling    DATA:    CBC:   Recent Labs     12/27/20  1605   WBC 6.0   HGB 13.0*   HCT 39.1*        BMP:    Recent Labs     12/27/20  1605   *   K 4.1   CL 99   CO2 26   BUN 13   CREATININE 1.0   GLUCOSE 219*     Hepatic:   Recent Labs     12/27/20  1605   *   *   BILITOT 1.6*   ALKPHOS 324*     Mag:    No results for input(s): MG in the last 72 hours. Phos:   No results for input(s): PHOS in the last 72 hours. INR:   Recent Labs     12/27/20  1605   INR 1.12       Radiology Review: Images personally reviewed by me. CT images reviewed and show cholecystitis with cholelithiasis as well as signs of chronic bladder outlet obstruction      IMPRESSION/RECOMMENDATIONS:    Acute calculus cholecystitis. Given the prolonged prehospital course, ideally the patient will be treated nonoperatively at this time with IV antibiotics and bowel rest to get the gallbladder to cool down. Subsequently, we can plan for interval cholecystectomy.   In addition, he should probably be worked up for the bladder outlet obstruction    Electronically signed by Renea Daigle MD     [address]

## 2020-12-28 NOTE — CARE COORDINATION
Per information from primary nurse at 1100 huddle, pt without needs at discharge. IPTA. Disposition likely 1-2 days - pending advancement of diet and IV antibiotics. Please consult CM team, should needs arise.      Chastity Sepulveda RN

## 2020-12-28 NOTE — CONSULTS
is reviewed below. Last EGD: None  Last Colonoscopy: None    Health Maintenance   Topic Date Due    Hepatitis C screen  1970    Lipid screen  04/18/1982    HIV screen  04/18/1987    Diabetes screen  04/18/2012    Flu vaccine (1) 09/01/2020    Potassium monitoring  12/28/2021    Creatinine monitoring  12/28/2021    DTaP/Tdap/Td vaccine (2 - Td) 01/28/2030    Hepatitis A vaccine  Aged Out    Hepatitis B vaccine  Aged Out    Hib vaccine  Aged Out    Meningococcal (ACWY) vaccine  Aged Out    Pneumococcal 0-64 years Vaccine  Aged Out     PAST MEDICAL HISTORY     Past Medical History:   Diagnosis Date    Diabetes mellitus ([facility] [address]     Hypercholesteremia     Hypertension     Thyroid cancer ([facility] [address]      FAMILY HISTORY   History reviewed. No pertinent family history. SOCIAL HISTORY     Social History     Tobacco Use    Smoking status: Former Smoker    Smokeless tobacco: Never Used    Tobacco comment: QUITE 15 YEARS AGO   Substance Use Topics    Alcohol use: Not Currently     Comment: quit 6 years ago    Drug use: Never     SURGICAL HISTORY     Past Surgical History:   Procedure Laterality Date    APPENDECTOMY      TOTAL THYROIDECTOMY  2017     ALLERGIES   No Known Allergies  CURRENT MEDICATIONS      piperacillin-tazobactam  3.375 g Intravenous Q8H    FLUoxetine  20 mg Oral BID    levothyroxine  25 mcg Oral QAM AC    losartan  75 mg Oral Daily    sodium chloride flush  10 mL Intravenous 2 times per day    enoxaparin  40 mg Subcutaneous Daily    insulin lispro  0-12 Units Subcutaneous TID WC    insulin lispro  0-6 Units Subcutaneous Nightly      sodium chloride 100 mL/hr at 12/27/20 2302    dextrose       sodium chloride flush, acetaminophen, promethazine **OR** ondansetron, morphine, glucose, dextrose, glucagon (rDNA), dextrose  HOME MEDICATIONS  [unfilled]  IMMUNIZATIONS     There is no immunization history on file for this patient.   REVIEW OF SYSTEMS   See HPI for further details and pertinent postiives. Negative for the following:  Constitutional: Negative for weight change. Negative for appetite change and fatigue. HENT: Negative for nosebleeds, sore throat, mouth sores, trouble swallowing and voice change. Respiratory: Negative for cough, choking and chest tightness. Cardiovascular: Negative for chest pain   Gastrointestinal: See HPI  Musculoskeletal: Negative for arthralgias. Skin: Negative for pallor. Neurological: Negative for weakness and light-headedness. Hematological: Negative for adenopathy. Does not bruise/bleed easily. Psychiatric/Behavioral: Negative for suicidal ideas. PHYSICAL EXAM   VITAL SIGNS: BP (!) 142/90   Pulse 52   Temp 97.5 °F (36.4 °C) (Oral)   Resp 16   Ht 5' 8\" (1.727 m)   Wt 189 lb 6.4 oz (85.9 kg)   SpO2 96%   BMI 28.80 kg/m²   With regards to weight, he reports stable / unchanged. Review of available records reveals: Wt Readings from Last 50 Encounters:   12/27/20 189 lb 6.4 oz (85.9 kg)     Constitutional: Well developed, Well nourished, No acute distress, Non-toxic appearance. HENT: Normocephalic, Atraumatic, Bilateral external ears normal, Oropharynx moist, No oral exudates, Nose normal.   Eyes: Conjunctiva normal, No discharge. Neck: Normal range of motion, No tenderness, Supple, No stridor. Lymphatic: No cervical, subclavian, or axillary lymphadenopathy. Cardiovascular: Normal heart rate, Normal rhythm, No murmurs, No rubs, No gallops. Thorax & Lungs: Normal breath sounds, No respiratory distress, No wheezing, No chest tenderness. Abdomen: normal bowel sounds, soft, mild tenderness in the right upper quadrant, non distended, no hernias   Rectal:  Deferred. Skin: Warm, Dry, No erythema, No rash. No bruising. No spider hemangiomas. Back: No tenderness, No CVA tenderness. Lower Extremities: Intact distal pulses, No edema, No tenderness, No cyanosis, No clubbing.   Neurologic: Alert & oriented x 3, Normal motor function, Normal sensory function, No focal deficits noted. No asterixis. RADIOLOGY/PROCEDURES     Results for orders placed during the hospital encounter of 12/27/20   CT ABDOMEN PELVIS W IV CONTRAST Additional Contrast? None    Narrative EXAMINATION:  CT OF THE ABDOMEN AND PELVIS WITH CONTRAST 12/27/2020 5:27 pm    TECHNIQUE:  CT of the abdomen and pelvis was performed with the administration of  intravenous contrast. Multiplanar reformatted images are provided for review. Dose modulation, iterative reconstruction, and/or weight based adjustment of  the mA/kV was utilized to reduce the radiation dose to as low as reasonably  achievable. COMPARISON:  None. HISTORY:  ORDERING SYSTEM PROVIDED HISTORY: Right upper quadrant abdominal pain  TECHNOLOGIST PROVIDED HISTORY:  Reason for exam:->Right upper quadrant abdominal pain  Additional Contrast?->None  Reason for Exam: RUQ pain x 3 months  Acuity: Acute  Type of Exam: Initial  Additional signs and symptoms: nausea,constipation,last BM yesterday,first  one in a week  Relevant Medical/Surgical History: pt recently had a barium study at [facility]    FINDINGS:  Lower Chest: Lung bases clear    Organs: Stones in the gallbladder. Gallbladder mucosal enhancement with  pericholecystic edema. 1 cm left renal cyst.  1.7 cm splenic cyst inferior  pole. Remaining solid organs unremarkable. No pancreatic duct dilatation. Common bile duct normal in caliber    GI/Bowel: Diverticula of the sigmoid colon with no inflammatory change. No  other gastrointestinal abnormality demonstrated. Pelvis: Mildly enlarged prostate. Mildly distended urinary bladder with  bladder wall thickening and slight haziness of the perivesical fat. No free  pelvic fluid    Peritoneum/Retroperitoneum: No ascites or pneumoperitoneum. Aorta  unremarkable    Bones/Soft Tissues: No acute bony abnormality      Impression 1. Cholelithiasis with cholecystitis  2.  Appearance of the urinary bladder may be due to cystitis and/or chronic  bladder outlet obstruction. Correlate with urinalysis         COURSE & MEDICAL DECISION MAKING     Lab Results   Component Value Date    WBC 6.5 12/28/2020    HGB 12.6 (L) 12/28/2020    HCT 37.6 (L) 12/28/2020     12/28/2020     (H) 12/28/2020     (H) 12/28/2020     12/28/2020    K 4.1 12/28/2020     12/28/2020    CREATININE 1.0 12/28/2020    BUN 11 12/28/2020    CO2 28 12/28/2020    INR 1.12 12/27/2020    LABMICR Not Indicated 12/27/2020     Lab Results   Component Value Date    BILIDIR 0.9 (H) 12/27/2020     No results found for: PTH, CAION, PHOS  Lab Results   Component Value Date    LABALBU 3.6 12/28/2020    ALKPHOS 292 12/28/2020     12/28/2020     12/28/2020    BILITOT 1.6 12/28/2020    BILIDIR 0.9 12/27/2020     Lab Results   Component Value Date    LIPASE 35.0 12/28/2020    LIPASE 69.0 (H) 12/27/2020     No results found for: AMYLASE  Lab Results   Component Value Date    INR 1.12 12/27/2020    PROTIME 13.0 12/27/2020   . FINAL IMPRESSION/ASSESSMENT/PLAN       1. Abdominal pain with abnormal liver chemistries (mixed pattern with hepatocellular predominance) likely due to acute cholecystitis. No evidence of choledocholithiasis. Ruled out acute pancreatitis    Plan:  Continue IV antibiotics for cholecystitis. Obtain viral hepatitis A, B and C serologies. Surgery evaluation for cholecystectomy  No role for ERCP at present  Continue to monitor LFTs, CBC  Continue liquid diet for now. May advance to low-fat diet as tolerated. 1.  The patient indicates understanding of these issues and agrees with the plan. 2.  I reviewed the patient's medical information and medical history. 3.  I have reviewed the past medical, family, and social history sections including the medications and allergies listed in the above medical record. Thank you for involving [facility]) Gastroenterology in [facility]. For further questions or concerns, we can best be reached through perfect serv.         Genia Frank 12/28/20 8:55 AM EST    [facility]   Phone [phone]   Fax [phone]

## 2020-12-28 NOTE — PROGRESS NOTES
Patient admitted from emergency department via w/c. Assisted to bed. Vital signs obtained. Orders reviewed and acknowledged. Admission completed. Oriented to room, call light and environment. Questions answered. Bed placed in low position. Call light explained and within reach.  Kavin Beaulieu RN

## 2020-12-28 NOTE — H&P
Hospital Medicine History & Physical      PCP: No primary care provider on file. Date of Admission: 12/27/2020    Date of Service: Pt seen/examined on 12/27/2020 and Admitted to Inpatient with expected LOS greater than two midnights due to medical therapy. Chief Complaint:    Chief Complaint   Patient presents with    Abdominal Pain     seen at urgent care in [facility], states he was sent to the [facility]. Had CT scan done. They wanted to admit patient, but he didn't want to be admitted over Christmas. CT scan showed:  bilateral hydronephrosis, distended bladder, cholelithiasis, constipation, etc.     Nausea     History Of Present Illness:      52 y.o. male, with PMH of HTN, HLD, and DM 2, who presented to [facility] with right upper quadrant abdominal pain. History was obtained from the patient and review of the EMR. The patient was recently seen at urgent care a few days ago and was sent to the ED for further evaluation. He went to [facility] ED on Wednesday and had a CT scan done which showed bilateral hydronephrosis, cholelithiasis, distended bladder, and more. They wanted to admit the patient at that time, but due to the Christmas holiday, the patient did not want to be admitted and subsequently went home. He comes in today to [facility] for the same symptoms and had repeat CT scan done. This repeat scan showed cholelithiasis with cholecystitis and possible cystitis and/or chronic bladder outlet obstruction. He is noted to have significantly elevated LFTs along with hyperbilirubinemia. General surgery was consulted in the ED, plans for possible cholecystectomy in the future once the gallbladder has calmed down. He will be admitted for general surgery and GI consultation.      Past Medical History:          Diagnosis Date    Diabetes mellitus ([address]     Hypercholesteremia     Hypertension     Thyroid cancer [facility])        Past Surgical History:          Procedure Laterality Date  APPENDECTOMY      TOTAL THYROIDECTOMY  2017       Medications Prior to Admission:      Prior to Admission medications    Medication Sig Start Date End Date Taking? Authorizing Provider   metFORMIN (GLUCOPHAGE) 500 MG tablet Take 1,000 mg by mouth 2 times daily (with meals)   Yes Historical Provider, MD   Levothyroxine Sodium (SYNTHROID PO) Take 25 mcg by mouth every morning (before breakfast)   Yes Historical Provider, MD   losartan (COZAAR) 25 MG tablet Take 75 mg by mouth daily   Yes Historical Provider, MD   FLUoxetine (PROZAC) 20 MG capsule Take 20 mg by mouth 2 times daily   Yes Historical Provider, MD   atorvastatin (LIPITOR) 80 MG tablet Take 120 mg by mouth daily   Yes Historical Provider, MD       Allergies:  Patient has no known allergies. Social History:      The patient currently lives independently. TOBACCO:   reports that he has never smoked. He has never used smokeless tobacco.  ETOH:   reports previous alcohol use. Family History:      Reviewed in detail. Positive as follows:    History reviewed. No pertinent family history. REVIEW OF SYSTEMS:   Pertinent positives as noted in the HPI. All other systems reviewed and negative. PHYSICAL EXAM PERFORMED:    BP (!) 154/106   Pulse 82   Temp 96.5 °F (35.8 °C) (Temporal)   Resp 18   Ht 5' 8\" (1.727 m)   Wt 185 lb (83.9 kg)   SpO2 99%   BMI 28.13 kg/m²     General appearance:  No apparent distress, appears stated age and cooperative. HEENT:  Normal cephalic, atraumatic without obvious deformity. Pupils equal, round, and reactive to light. Extra ocular muscles intact. Conjunctivae/corneas clear. Neck: Supple, with full range of motion. No jugular venous distention. Trachea midline. Respiratory:  Normal respiratory effort. Clear to auscultation, bilaterally without Rales/Wheezes/Rhonchi. Cardiovascular:  Regular rate and rhythm with normal S1/S2 without murmurs, rubs or gallops.   Abdomen: Soft, non-tender, non-distended with normal bowel sounds. Musculoskeletal:  No clubbing, cyanosis or edema bilaterally. Full range of motion without deformity. Skin: Skin color, texture, turgor normal.  No rashes or lesions. Neurologic:  Neurovascularly intact without any focal sensory/motor deficits. Cranial nerves: II-XII intact, grossly non-focal.  Psychiatric:  Alert and oriented, thought content appropriate, normal insight  Capillary Refill: Brisk,< 3 seconds   Peripheral Pulses: +2 palpable, equal bilaterally       Labs:     Recent Labs     12/27/20  1605   WBC 6.0   HGB 13.0*   HCT 39.1*        Recent Labs     12/27/20  1605   *   K 4.1   CL 99   CO2 26   BUN 13   CREATININE 1.0   CALCIUM 8.5     Recent Labs     12/27/20  1605   *   *   BILIDIR 0.9*   BILITOT 1.6*   ALKPHOS 324*     Recent Labs     12/27/20  1605   INR 1.12     Recent Labs     12/27/20  1605   TROPONINI <0.01       Urinalysis:      Lab Results   Component Value Date    NITRU Negative 12/27/2020    BLOODU Negative 12/27/2020    SPECGRAV 1.015 12/27/2020    GLUCOSEU >=1000 12/27/2020       Radiology:     CXR: I have reviewed the CXR with the following interpretation: n/a  EKG:  I have reviewed the EKG with the following interpretation: NSR    US GALLBLADDER RUQ   Final Result   Calculus cholecystitis         CT ABDOMEN PELVIS W IV CONTRAST Additional Contrast? None   Final Result   1. Cholelithiasis with cholecystitis   2. Appearance of the urinary bladder may be due to cystitis and/or chronic   bladder outlet obstruction. Correlate with urinalysis         XR ABDOMEN (KUB) (SINGLE AP VIEW)   Final Result   Moderate retained colonic stool as outlined above. No small bowel distension. ASSESSMENT:    Active Hospital Problems    Diagnosis Date Noted    Acute calculous cholecystitis [K80.00] 12/27/2020         PLAN:    Acute calculus cholecystitis  - CT abdomen pelvis revealed: Cholelithiasis with cholecystitis.   Possible cystitis and/or chronic bladder outlet obstruction.  - Right upper quadrant gallbladder ultrasound revealed calculus cholecystitis  - General surgery was consulted in the ED, patient will likely need antibiotics to decrease inflammation before able to have cholecystectomy  - GI consulted, thank you recommendations  - Empiric IV zosyn started in the ED, continued 12/27/2020    Abnormal LFTs 2/2 above  - Monitor with CMP  - Avoid hepatotoxins as able    Essential HTN, not well controlled. - Continue home losaran  - Telemetry monitoring    Hx of HLD, controlled with statin. - Hold home Lipitor  - Follow-up with PCP for med adjustments    DM2, uncontrolled. -  on admission  - Hemoglobin a1c pending  - MDSSI   - POCT ac/hs  - Hypoglycemia protocol      DVT Prophylaxis: lovenox  Diet: Diet NPO Effective Now  Code Status: No Order    PT/OT Eval Status: not ordered    Dispo - pending clinical improvement       Mary Lancaster Risk - NP    Thank you No primary care provider on file. for the opportunity to be involved in this patient's care.  If you have any questions or concerns please feel free to contact me at [phone].  -------------------------Anticipated Dr. Bj duffy--------------------

## 2020-12-29 LAB
ALBUMIN SERPL-MCNC: 3.5 G/DL (ref 3.4–5)
ALP BLD-CCNC: 286 U/L (ref 40–129)
ALT SERPL-CCNC: 799 U/L (ref 10–40)
ANION GAP SERPL CALCULATED.3IONS-SCNC: 6 MMOL/L (ref 3–16)
AST SERPL-CCNC: 596 U/L (ref 15–37)
BILIRUB SERPL-MCNC: 1.6 MG/DL (ref 0–1)
BILIRUBIN DIRECT: 1.1 MG/DL (ref 0–0.3)
BILIRUBIN, INDIRECT: 0.5 MG/DL (ref 0–1)
BUN BLDV-MCNC: 8 MG/DL (ref 7–20)
CALCIUM SERPL-MCNC: 7.9 MG/DL (ref 8.3–10.6)
CHLORIDE BLD-SCNC: 102 MMOL/L (ref 99–110)
CO2: 27 MMOL/L (ref 21–32)
CREAT SERPL-MCNC: 0.8 MG/DL (ref 0.9–1.3)
ESTIMATED AVERAGE GLUCOSE: 263.3 MG/DL
GFR AFRICAN AMERICAN: >60
GFR NON-AFRICAN AMERICAN: >60
GLUCOSE BLD-MCNC: 277 MG/DL (ref 70–99)
GLUCOSE BLD-MCNC: 286 MG/DL (ref 70–99)
GLUCOSE BLD-MCNC: 287 MG/DL (ref 70–99)
GLUCOSE BLD-MCNC: 294 MG/DL (ref 70–99)
GLUCOSE BLD-MCNC: 96 MG/DL (ref 70–99)
HAV AB SERPL IA-ACNC: NEGATIVE
HAV IGM SER IA-ACNC: NORMAL
HBA1C MFR BLD: 10.8 %
HBV SURFACE AB TITR SER: <3.5 MIU/ML
HCT VFR BLD CALC: 37.3 % (ref 40.5–52.5)
HEMOGLOBIN: 12.3 G/DL (ref 13.5–17.5)
HEPATITIS B CORE IGM ANTIBODY: NORMAL
HEPATITIS B CORE TOTAL ANTIBODY: NEGATIVE
HEPATITIS B SURFACE ANTIGEN INTERPRETATION: NORMAL
HEPATITIS C ANTIBODY INTERPRETATION: REACTIVE
MCH RBC QN AUTO: 29.6 PG (ref 26–34)
MCHC RBC AUTO-ENTMCNC: 32.9 G/DL (ref 31–36)
MCV RBC AUTO: 90 FL (ref 80–100)
PDW BLD-RTO: 15.1 % (ref 12.4–15.4)
PERFORMED ON: ABNORMAL
PERFORMED ON: NORMAL
PHOSPHORUS: 3.8 MG/DL (ref 2.5–4.9)
PLATELET # BLD: 290 K/UL (ref 135–450)
PMV BLD AUTO: 8 FL (ref 5–10.5)
POTASSIUM SERPL-SCNC: 3.4 MMOL/L (ref 3.5–5.1)
RBC # BLD: 4.14 M/UL (ref 4.2–5.9)
SODIUM BLD-SCNC: 135 MMOL/L (ref 136–145)
TOTAL PROTEIN: 6.1 G/DL (ref 6.4–8.2)
WBC # BLD: 4.7 K/UL (ref 4–11)

## 2020-12-29 PROCEDURE — 6370000000 HC RX 637 (ALT 250 FOR IP): Performed by: NURSE PRACTITIONER

## 2020-12-29 PROCEDURE — 6360000002 HC RX W HCPCS: Performed by: NURSE PRACTITIONER

## 2020-12-29 PROCEDURE — 2580000003 HC RX 258: Performed by: NURSE PRACTITIONER

## 2020-12-29 PROCEDURE — 85027 COMPLETE CBC AUTOMATED: CPT

## 2020-12-29 PROCEDURE — 1200000000 HC SEMI PRIVATE

## 2020-12-29 PROCEDURE — 99232 SBSQ HOSP IP/OBS MODERATE 35: CPT | Performed by: SURGERY

## 2020-12-29 PROCEDURE — 36415 COLL VENOUS BLD VENIPUNCTURE: CPT

## 2020-12-29 PROCEDURE — 80069 RENAL FUNCTION PANEL: CPT

## 2020-12-29 PROCEDURE — APPSS45 APP SPLIT SHARED TIME 31-45 MINUTES: Performed by: CLINICAL NURSE SPECIALIST

## 2020-12-29 PROCEDURE — 80076 HEPATIC FUNCTION PANEL: CPT

## 2020-12-29 RX ADMIN — INSULIN LISPRO 3 UNITS: 100 INJECTION, SOLUTION INTRAVENOUS; SUBCUTANEOUS at 20:16

## 2020-12-29 RX ADMIN — MORPHINE SULFATE 2 MG: 2 INJECTION, SOLUTION INTRAMUSCULAR; INTRAVENOUS at 15:08

## 2020-12-29 RX ADMIN — INSULIN LISPRO 6 UNITS: 100 INJECTION, SOLUTION INTRAVENOUS; SUBCUTANEOUS at 12:08

## 2020-12-29 RX ADMIN — PIPERACILLIN AND TAZOBACTAM 3.38 G: 3; .375 INJECTION, POWDER, FOR SOLUTION INTRAVENOUS at 03:05

## 2020-12-29 RX ADMIN — SODIUM CHLORIDE: 900 INJECTION INTRAVENOUS at 03:09

## 2020-12-29 RX ADMIN — PIPERACILLIN AND TAZOBACTAM 3.38 G: 3; .375 INJECTION, POWDER, FOR SOLUTION INTRAVENOUS at 11:00

## 2020-12-29 RX ADMIN — INSULIN LISPRO 6 UNITS: 100 INJECTION, SOLUTION INTRAVENOUS; SUBCUTANEOUS at 18:05

## 2020-12-29 RX ADMIN — FLUOXETINE HYDROCHLORIDE 20 MG: 20 CAPSULE ORAL at 09:45

## 2020-12-29 RX ADMIN — MORPHINE SULFATE 2 MG: 2 INJECTION, SOLUTION INTRAMUSCULAR; INTRAVENOUS at 05:40

## 2020-12-29 RX ADMIN — MORPHINE SULFATE 2 MG: 2 INJECTION, SOLUTION INTRAMUSCULAR; INTRAVENOUS at 09:46

## 2020-12-29 RX ADMIN — MORPHINE SULFATE 2 MG: 2 INJECTION, SOLUTION INTRAMUSCULAR; INTRAVENOUS at 21:36

## 2020-12-29 RX ADMIN — MORPHINE SULFATE 2 MG: 2 INJECTION, SOLUTION INTRAMUSCULAR; INTRAVENOUS at 01:35

## 2020-12-29 RX ADMIN — FLUOXETINE HYDROCHLORIDE 20 MG: 20 CAPSULE ORAL at 20:14

## 2020-12-29 RX ADMIN — PIPERACILLIN AND TAZOBACTAM 3.38 G: 3; .375 INJECTION, POWDER, FOR SOLUTION INTRAVENOUS at 18:04

## 2020-12-29 RX ADMIN — LOSARTAN POTASSIUM 75 MG: 25 TABLET, FILM COATED ORAL at 09:45

## 2020-12-29 RX ADMIN — MORPHINE SULFATE 2 MG: 2 INJECTION, SOLUTION INTRAMUSCULAR; INTRAVENOUS at 18:16

## 2020-12-29 RX ADMIN — ENOXAPARIN SODIUM 40 MG: 40 INJECTION SUBCUTANEOUS at 09:46

## 2020-12-29 RX ADMIN — LEVOTHYROXINE SODIUM 25 MCG: 0.03 TABLET ORAL at 05:40

## 2020-12-29 RX ADMIN — SODIUM CHLORIDE: 900 INJECTION INTRAVENOUS at 14:59

## 2020-12-29 ASSESSMENT — PAIN DESCRIPTION - LOCATION: LOCATION: ABDOMEN

## 2020-12-29 ASSESSMENT — PAIN SCALES - GENERAL
PAINLEVEL_OUTOF10: 8
PAINLEVEL_OUTOF10: 9

## 2020-12-29 ASSESSMENT — PAIN DESCRIPTION - PROGRESSION
CLINICAL_PROGRESSION: NOT CHANGED
CLINICAL_PROGRESSION: NOT CHANGED

## 2020-12-29 ASSESSMENT — PAIN DESCRIPTION - FREQUENCY: FREQUENCY: INTERMITTENT

## 2020-12-29 ASSESSMENT — PAIN DESCRIPTION - PAIN TYPE
TYPE: ACUTE PAIN
TYPE: ACUTE PAIN

## 2020-12-29 ASSESSMENT — PAIN DESCRIPTION - ONSET: ONSET: ON-GOING

## 2020-12-29 NOTE — PROGRESS NOTES
[facility]    PATIENT NAME: Noel Weston     TODAY'S DATE: 12/29/2020    CHIEF COMPLAINT: Abdominal pain    INTERVAL HISTORY/HPI:    Pt reports abdominal pain is ongoing. Denies nausea. REVIEW OF SYSTEMS:  Pertinent positives and negatives as per interval history section    OBJECTIVE:  VITALS:  /71   Pulse 52   Temp 97.4 °F (36.3 °C) (Oral)   Resp 16   Ht 5' 8\" (1.727 m)   Wt 189 lb 6.4 oz (85.9 kg)   SpO2 97%   BMI 28.80 kg/m²     INTAKE/OUTPUT:    No intake/output data recorded. I/O this shift:  In: 120 [P.O.:120]  Out: -     CONSTITUTIONAL:  awake and alert  LUNGS:  Respirations easy and unlabored, no crackles or wheezes  CARD:  bradycardic with regular rhythm  ABDOMEN:  normal bowel sounds, soft, non-distended, mild tenderness noted in the right upper quadrant    Data:  CBC:   Recent Labs     12/27/20  1605 12/28/20  0749   WBC 6.0 6.5   HGB 13.0* 12.6*   HCT 39.1* 37.6*    317     BMP:    Recent Labs     12/27/20  1605 12/28/20  0749   * 138   K 4.1 4.1   CL 99 103   CO2 26 28   BUN 13 11   CREATININE 1.0 1.0   GLUCOSE 219* 95     Hepatic:   Recent Labs     12/27/20  1605 12/28/20  0749   * 567*   * 859*   BILITOT 1.6* 1.6*   ALKPHOS 324* 292*     Mag:    No results for input(s): MG in the last 72 hours. Phos:   No results for input(s): PHOS in the last 72 hours. INR:   Recent Labs     12/27/20  1605   INR 1.12     Hepatitis C Ab - reactive      ASSESSMENT AND PLAN:  Possible Acute calculus cholecystitis - markedly elevated LFTs primary hepatocellular - Hepatitis C is reactive. Follow GI input regarding Hep C. Continue with antibiotics for now. Electronically signed by Harmony Rowland APRTYSON - [address] [facility]    Surgery Staff    I have examined this patient and read and agree with the note by Harmony Rowland CNP from today. Would prefer not to operate with any degree of acute hepatitic process (ie HCV).   Would hope acute process will subside and we can then plan an interval elective lap hans. If pt shows clear sx of worsening acute cholecystitis, could benefit from temporizing perc cholecystostomy to decompress.     Will discuss w/ GI and continue to follow    [address]

## 2020-12-29 NOTE — PROGRESS NOTES
[address] ,  [address] [facility]  Phone: [phone]   TJE:[phone]  [address] [place] [address] [address],  [address] [facility] [place]  Phone: [phone]   Fax:[phone]    HPI: Noel Weston is a(n)50 y.o. male with medical history of  hypertension, hyperlipidemia, diabetes mellitus type 2 and thyroid cancer status post thyroidectomy and radiation in 2017 admitted for right upper quadrant abdominal pain. Found to have abnormal liver chemistries and CT evidence of cholelithiasis with cholecystitis. Patient was seen and examined about 12:30 p.m. He reports abdominal pain is about the same as yesterday. Denies nausea, vomiting, fever or chills. Tolerating clear liquid diet. Current Hospital Schedued Meds   piperacillin-tazobactam  3.375 g Intravenous Q8H    FLUoxetine  20 mg Oral BID    levothyroxine  25 mcg Oral QAM AC    losartan  75 mg Oral Daily    sodium chloride flush  10 mL Intravenous 2 times per day    enoxaparin  40 mg Subcutaneous Daily    insulin lispro  0-12 Units Subcutaneous TID WC    insulin lispro  0-6 Units Subcutaneous Nightly     Current Hospital IV Meds   sodium chloride 100 mL/hr at 12/29/20 0309    dextrose       Current Hospital Prn Meds  oxyCODONE, sodium chloride flush, acetaminophen, promethazine **OR** ondansetron, morphine, glucose, dextrose, glucagon (rDNA), dextrose    No intake/output data recorded.   /71   Pulse 52   Temp 97.4 °F (36.3 °C) (Oral)   Resp 16   Ht 5' 8\" (1.727 m)   Wt 189 lb 6.4 oz (85.9 kg)   SpO2 97%   BMI 28.80 kg/m²     Wt Readings from Last 3 Encounters:   12/27/20 189 lb 6.4 oz (85.9 kg)       Physical Exam:  VITAL SIGNS: /71   Pulse 52   Temp 97.4 °F (36.3 °C) (Oral)   Resp 16   Ht 5' 8\" (1.727 m)   Wt 189 lb 6.4 oz (85.9 kg)   SpO2 97%   BMI 28.80 kg/m²   Wt Readings from Last 3 Encounters:   12/27/20 189 lb 6.4 oz (85.9 kg)     Constitutional: Well developed, Well nourished, No acute distress, Non-toxic appearance. HENT: Normocephalic, Atraumatic, Bilateral external ears normal, Oropharynx moist, No oral exudates, Nose normal.   Eyes: Conjunctiva normal, No discharge. Neck: Normal range of motion, No tenderness, Supple, No stridor. Cardiovascular: Normal heart rate, Normal rhythm, No murmurs, No rubs, No gallops. Thorax & Lungs: Normal breath sounds, No respiratory distress, No wheezing, No chest tenderness. Abdomen: normal bowel sounds, soft, mild tenderness in the right upper quadrant, non distended, no hernia  Rectal:  Deferred. Skin: Warm, Dry, No erythema, No rash. No bruising. No spider hemangiomas. Back: No tenderness, No CVA tenderness. Lower Extremities: Intact distal pulses, No edema, No tenderness, No cyanosis, No clubbing. Neurologic: Alert & oriented x 3,       Lab Results   Component Value Date     (H) 12/29/2020     (H) 12/29/2020    ALKPHOS 286 (H) 12/29/2020    BILIDIR 1.1 (H) 12/29/2020    PROT 6.1 (L) 12/29/2020    LABALBU 3.5 12/29/2020    INR 1.12 12/27/2020    LIPASE 35.0 12/28/2020     Lab Results   Component Value Date    WBC 4.7 12/29/2020    HGB 12.3 (L) 12/29/2020    HCT 37.3 (L) 12/29/2020    MCV 90.0 12/29/2020     12/29/2020     Lab Results   Component Value Date     12/29/2020    K 3.4 12/29/2020    K 4.1 12/28/2020     12/29/2020    CO2 27 12/29/2020    BUN 8 12/29/2020     Lab Results   Component Value Date    CREATININE 0.8 (L) 12/29/2020       A/P  1. Abdominal pain with abnormal liver chemistries (mixed pattern with hepatocellular predominance) likely due to acute cholecystitis. No evidence of choledocholithiasis. Ruled out acute pancreatitis and acute hepatitis A and B viral infection. 2.  Chronic hepatitis C virus infection (likely via sexual contact, denies IV drug use, tattoos or blood transfusions)     Plan:  Continue IV antibiotics for cholecystitis.    Surgery on board for interval cholecystectomy  No role for ERCP at present  Continue to monitor LFTs, CBC  Advance to low-fat diet as tolerated. Patient to follow-up with GI as outpatient for management of chronic hepatitis C virus infection  GI to sign off, recall as needed. Principal Problem:    Calculus of gallbladder with acute cholecystitis without obstruction  Active Problems:    Transaminitis    Abdominal pain, epigastric  Resolved Problems:    * No resolved hospital problems. *    Patient Active Problem List   Diagnosis Code    Calculus of gallbladder with acute cholecystitis without obstruction K80.00    Transaminitis R74.01    Abdominal pain, epigastric R10.13       Thank you for involving [facility]) Gastroenterology in the hospital care of Elmer Johansen. For further questions or concerns, we can best be reached through perfect serv.         Kalli Sheikh 12/29/20 11:57 AM EST

## 2020-12-29 NOTE — PROGRESS NOTES
Hospitalist Progress Note      PCP: No primary care provider on file. Date of Admission: 12/27/2020    Chief Complaint: Abdominal Pain    Subjective: no new c/o. Medications:  Reviewed    Infusion Medications    sodium chloride 100 mL/hr at 12/29/20 0309    dextrose       Scheduled Medications    piperacillin-tazobactam  3.375 g Intravenous Q8H    FLUoxetine  20 mg Oral BID    levothyroxine  25 mcg Oral QAM AC    losartan  75 mg Oral Daily    sodium chloride flush  10 mL Intravenous 2 times per day    enoxaparin  40 mg Subcutaneous Daily    insulin lispro  0-12 Units Subcutaneous TID WC    insulin lispro  0-6 Units Subcutaneous Nightly     PRN Meds: oxyCODONE, sodium chloride flush, acetaminophen, promethazine **OR** ondansetron, morphine, glucose, dextrose, glucagon (rDNA), dextrose    No intake or output data in the 24 hours ending 12/29/20 0831    Physical Exam Performed:    BP (!) 145/91   Pulse 60   Temp 97.5 °F (36.4 °C) (Oral)   Resp 17   Ht 5' 8\" (1.727 m)   Wt 189 lb 6.4 oz (85.9 kg)   SpO2 97%   BMI 28.80 kg/m²     General appearance: No apparent distress, appears stated age and cooperative. HEENT: Pupils equal, round, and reactive to light. Conjunctivae/corneas clear. Neck: Supple, with full range of motion. No jugular venous distention. Trachea midline. Respiratory:  Normal respiratory effort. Clear to auscultation, bilaterally without Rales/Wheezes/Rhonchi. Cardiovascular: Regular rate and rhythm with normal S1/S2 without murmurs, rubs or gallops. Abdomen: Soft, non-tender, non-distended with normal bowel sounds. Musculoskeletal: No clubbing, cyanosis or edema bilaterally. Full range of motion without deformity. Skin: Skin color, texture, turgor normal.  No rashes or lesions. Neurologic:  Neurovascularly intact without any focal sensory/motor deficits.  Cranial nerves: II-XII intact, grossly non-focal.  Psychiatric: Alert and oriented, thought content appropriate, normal insight  Capillary Refill: Brisk,< 3 seconds   Peripheral Pulses: +2 palpable, equal bilaterally       Labs:   Recent Labs     12/27/20  1605 12/28/20  0749   WBC 6.0 6.5   HGB 13.0* 12.6*   HCT 39.1* 37.6*    317     Recent Labs     12/27/20  1605 12/28/20  0749   * 138   K 4.1 4.1   CL 99 103   CO2 26 28   BUN 13 11   CREATININE 1.0 1.0   CALCIUM 8.5 8.1*     Recent Labs     12/27/20  1605 12/28/20  0749   * 567*   * 859*   BILIDIR 0.9*  --    BILITOT 1.6* 1.6*   ALKPHOS 324* 292*     Recent Labs     12/27/20  1605   INR 1.12     Recent Labs     12/27/20  1605   TROPONINI <0.01       Urinalysis:      Lab Results   Component Value Date    NITRU Negative 12/27/2020    BLOODU Negative 12/27/2020    SPECGRAV 1.015 12/27/2020    GLUCOSEU >=1000 12/27/2020       Consults:    IP CONSULT TO GENERAL SURGERY  IP CONSULT TO HOSPITALIST  IP CONSULT TO GENERAL SURGERY  IP CONSULT TO GI      Assessment/Plan:    Active Hospital Problems    Diagnosis    Transaminitis [R74.01]    Abdominal pain, epigastric [R10.13]    Calculus of gallbladder with acute cholecystitis without obstruction [K80.00]       Acute calculus cholecystitis - CT abdomen pelvis revealed Cholelithiasis with cholecystitis. Possible cystitis and/or chronic bladder outlet obstruction.  - Right upper quadrant gallbladder ultrasound revealed calculus cholecystitis  - General surgery was consulted from ED and appreciated. GI consulted and appreciated. Started on empiric Zosyn in ED 27 Dec - continued.      Abnormal LFTs 2/2 above  - Monitor with CMP  - Avoid hepatotoxins as able     HTN - w/out known CAD and no evidence of active signs/sxs of ischemia/failure. Currently controlled on home meds w/ vitals reviewed and documented as above. HyperLipidemia - controlled on home Statin. Held w/ elevated LFTs. Continue f/u and med adjustment w/ PCP    DM2 - controlled on home oral antiGlycemics - held.   Follow FSBS/SSI medium regimen. Last HbA1c N/A. Anticipate resuming/continuing home regimen at discharge.         DVT Prophylaxis: LMWH/IPC  Diet: DIET CLEAR LIQUID;  Code Status: Full Code      PT/OT Eval Status: not yet ordered. Dispo - possibly Wed/Thurs 30/31 Dec pending clinical course and subspecialty recs.      Alejandro Espinal MD

## 2020-12-30 ENCOUNTER — APPOINTMENT (OUTPATIENT)
Dept: MRI IMAGING | Age: 50
DRG: 446 | End: 2020-12-30
Payer: OTHER GOVERNMENT

## 2020-12-30 LAB
ALBUMIN SERPL-MCNC: 3.5 G/DL (ref 3.4–5)
ALP BLD-CCNC: 306 U/L (ref 40–129)
ALT SERPL-CCNC: 860 U/L (ref 10–40)
ANION GAP SERPL CALCULATED.3IONS-SCNC: 7 MMOL/L (ref 3–16)
AST SERPL-CCNC: 630 U/L (ref 15–37)
BILIRUB SERPL-MCNC: 1.8 MG/DL (ref 0–1)
BILIRUBIN DIRECT: 1.1 MG/DL (ref 0–0.3)
BILIRUBIN, INDIRECT: 0.7 MG/DL (ref 0–1)
BUN BLDV-MCNC: 8 MG/DL (ref 7–20)
CALCIUM SERPL-MCNC: 8.8 MG/DL (ref 8.3–10.6)
CHLORIDE BLD-SCNC: 101 MMOL/L (ref 99–110)
CO2: 30 MMOL/L (ref 21–32)
CREAT SERPL-MCNC: 1.1 MG/DL (ref 0.9–1.3)
GFR AFRICAN AMERICAN: >60
GFR NON-AFRICAN AMERICAN: >60
GLUCOSE BLD-MCNC: 108 MG/DL (ref 70–99)
GLUCOSE BLD-MCNC: 161 MG/DL (ref 70–99)
GLUCOSE BLD-MCNC: 178 MG/DL (ref 70–99)
GLUCOSE BLD-MCNC: 186 MG/DL (ref 70–99)
GLUCOSE BLD-MCNC: 88 MG/DL (ref 70–99)
HCT VFR BLD CALC: 39.3 % (ref 40.5–52.5)
HEMOGLOBIN: 12.9 G/DL (ref 13.5–17.5)
MCH RBC QN AUTO: 29.9 PG (ref 26–34)
MCHC RBC AUTO-ENTMCNC: 32.9 G/DL (ref 31–36)
MCV RBC AUTO: 91.1 FL (ref 80–100)
PDW BLD-RTO: 15.2 % (ref 12.4–15.4)
PERFORMED ON: ABNORMAL
PERFORMED ON: NORMAL
PHOSPHORUS: 4.2 MG/DL (ref 2.5–4.9)
PLATELET # BLD: 305 K/UL (ref 135–450)
PMV BLD AUTO: 8 FL (ref 5–10.5)
POTASSIUM SERPL-SCNC: 4 MMOL/L (ref 3.5–5.1)
RBC # BLD: 4.31 M/UL (ref 4.2–5.9)
SODIUM BLD-SCNC: 138 MMOL/L (ref 136–145)
TOTAL PROTEIN: 6.4 G/DL (ref 6.4–8.2)
WBC # BLD: 5 K/UL (ref 4–11)

## 2020-12-30 PROCEDURE — 36415 COLL VENOUS BLD VENIPUNCTURE: CPT

## 2020-12-30 PROCEDURE — 6360000002 HC RX W HCPCS: Performed by: INTERNAL MEDICINE

## 2020-12-30 PROCEDURE — 80076 HEPATIC FUNCTION PANEL: CPT

## 2020-12-30 PROCEDURE — 85027 COMPLETE CBC AUTOMATED: CPT

## 2020-12-30 PROCEDURE — 6360000002 HC RX W HCPCS: Performed by: NURSE PRACTITIONER

## 2020-12-30 PROCEDURE — 74183 MRI ABD W/O CNTR FLWD CNTR: CPT

## 2020-12-30 PROCEDURE — 6360000004 HC RX CONTRAST MEDICATION: Performed by: INTERNAL MEDICINE

## 2020-12-30 PROCEDURE — A9579 GAD-BASE MR CONTRAST NOS,1ML: HCPCS | Performed by: INTERNAL MEDICINE

## 2020-12-30 PROCEDURE — 6370000000 HC RX 637 (ALT 250 FOR IP): Performed by: NURSE PRACTITIONER

## 2020-12-30 PROCEDURE — 1200000000 HC SEMI PRIVATE

## 2020-12-30 PROCEDURE — 80069 RENAL FUNCTION PANEL: CPT

## 2020-12-30 PROCEDURE — 99232 SBSQ HOSP IP/OBS MODERATE 35: CPT | Performed by: SURGERY

## 2020-12-30 PROCEDURE — 2580000003 HC RX 258: Performed by: NURSE PRACTITIONER

## 2020-12-30 RX ORDER — LORAZEPAM 2 MG/ML
1 INJECTION INTRAMUSCULAR ONCE
Status: COMPLETED | OUTPATIENT
Start: 2020-12-30 | End: 2020-12-30

## 2020-12-30 RX ADMIN — PIPERACILLIN AND TAZOBACTAM 3.38 G: 3; .375 INJECTION, POWDER, FOR SOLUTION INTRAVENOUS at 01:19

## 2020-12-30 RX ADMIN — OXYCODONE 5 MG: 5 TABLET ORAL at 08:58

## 2020-12-30 RX ADMIN — PIPERACILLIN AND TAZOBACTAM 3.38 G: 3; .375 INJECTION, POWDER, FOR SOLUTION INTRAVENOUS at 17:36

## 2020-12-30 RX ADMIN — FLUOXETINE HYDROCHLORIDE 20 MG: 20 CAPSULE ORAL at 08:58

## 2020-12-30 RX ADMIN — MORPHINE SULFATE 2 MG: 2 INJECTION, SOLUTION INTRAMUSCULAR; INTRAVENOUS at 11:03

## 2020-12-30 RX ADMIN — LOSARTAN POTASSIUM 75 MG: 25 TABLET, FILM COATED ORAL at 08:58

## 2020-12-30 RX ADMIN — LEVOTHYROXINE SODIUM 25 MCG: 0.03 TABLET ORAL at 05:39

## 2020-12-30 RX ADMIN — PIPERACILLIN AND TAZOBACTAM 3.38 G: 3; .375 INJECTION, POWDER, FOR SOLUTION INTRAVENOUS at 08:57

## 2020-12-30 RX ADMIN — LORAZEPAM 1 MG: 2 INJECTION, SOLUTION INTRAMUSCULAR; INTRAVENOUS at 14:36

## 2020-12-30 RX ADMIN — FLUOXETINE HYDROCHLORIDE 20 MG: 20 CAPSULE ORAL at 23:14

## 2020-12-30 RX ADMIN — SODIUM CHLORIDE: 900 INJECTION INTRAVENOUS at 01:19

## 2020-12-30 RX ADMIN — MORPHINE SULFATE 2 MG: 2 INJECTION, SOLUTION INTRAMUSCULAR; INTRAVENOUS at 01:19

## 2020-12-30 RX ADMIN — OXYCODONE 5 MG: 5 TABLET ORAL at 14:36

## 2020-12-30 RX ADMIN — MORPHINE SULFATE 2 MG: 2 INJECTION, SOLUTION INTRAMUSCULAR; INTRAVENOUS at 05:42

## 2020-12-30 RX ADMIN — Medication 10 ML: at 23:15

## 2020-12-30 RX ADMIN — ENOXAPARIN SODIUM 40 MG: 40 INJECTION SUBCUTANEOUS at 08:58

## 2020-12-30 RX ADMIN — GADOTERIDOL 17 ML: 279.3 INJECTION, SOLUTION INTRAVENOUS at 15:41

## 2020-12-30 RX ADMIN — SODIUM CHLORIDE: 900 INJECTION INTRAVENOUS at 23:22

## 2020-12-30 RX ADMIN — MORPHINE SULFATE 2 MG: 2 INJECTION, SOLUTION INTRAMUSCULAR; INTRAVENOUS at 23:14

## 2020-12-30 RX ADMIN — ONDANSETRON 4 MG: 2 INJECTION INTRAMUSCULAR; INTRAVENOUS at 13:54

## 2020-12-30 ASSESSMENT — PAIN DESCRIPTION - PROGRESSION
CLINICAL_PROGRESSION: NOT CHANGED

## 2020-12-30 ASSESSMENT — PAIN DESCRIPTION - DESCRIPTORS: DESCRIPTORS: ACHING

## 2020-12-30 ASSESSMENT — PAIN SCALES - GENERAL
PAINLEVEL_OUTOF10: 8
PAINLEVEL_OUTOF10: 8

## 2020-12-30 ASSESSMENT — PAIN DESCRIPTION - PAIN TYPE: TYPE: ACUTE PAIN

## 2020-12-30 NOTE — PROGRESS NOTES
12/27/20 189 lb 6.4 oz (85.9 kg)     Constitutional: Well developed, Well nourished, No acute distress, Non-toxic appearance. HENT: Normocephalic, Atraumatic, Bilateral external ears normal, Oropharynx moist, No oral exudates, Nose normal.   Eyes: Conjunctiva normal, No discharge. Neck: Normal range of motion, No tenderness, Supple, No stridor. Cardiovascular: Normal heart rate, Normal rhythm, No murmurs, No rubs, No gallops. Thorax & Lungs: Normal breath sounds, No respiratory distress, No wheezing, No chest tenderness. Abdomen: normal bowel sounds, soft, mild tenderness in RUQ, non distended, no hernias,  Rectal:  Deferred. Skin: Warm, Dry, No erythema, No rash. No bruising. No spider hemangiomas. Back: No tenderness, No CVA tenderness. Lower Extremities: Intact distal pulses, No edema, No tenderness, No cyanosis, No clubbing. Neurologic: Alert & oriented x 3,         Lab Results   Component Value Date     (H) 12/30/2020     (H) 12/30/2020    ALKPHOS 306 (H) 12/30/2020    BILIDIR 1.1 (H) 12/30/2020    PROT 6.4 12/30/2020    LABALBU 3.5 12/30/2020    INR 1.12 12/27/2020    LIPASE 35.0 12/28/2020     Lab Results   Component Value Date    WBC 5.0 12/30/2020    HGB 12.9 (L) 12/30/2020    HCT 39.3 (L) 12/30/2020    MCV 91.1 12/30/2020     12/30/2020     Lab Results   Component Value Date     12/30/2020    K 4.0 12/30/2020    K 4.1 12/28/2020     12/30/2020    CO2 30 12/30/2020    BUN 8 12/30/2020     Lab Results   Component Value Date    CREATININE 1.1 12/30/2020       A/P  1.  Abdominal pain with abnormal liver chemistries (mixed pattern with hepatocellular predominance) likely due to acute cholecystitis.  No evidence of choledocholithiasis on US and CT.  Ruled out acute pancreatitis and acute hepatitis A and B viral infection.   2.  Chronic hepatitis C virus infection      Plan:  Continue IV antibiotics for cholecystitis.   Surgery on board for interval cholecystectomy  Continue to monitor LFTs, CBC  Clear liquid diet for now  Given persisting abdominal pain and non improving liver chemistries, consideration for MRI/MRCP to further evaluate the biliary tree was given however patient refuses MRI citing severe claustrophobia/anxiety even with sedation. Given the lack of endoscopic ultrasound at [facility], further evaluation of possible choledocholithiasis may have to be performed at cholecystectomy with Intra-Op cholangiogram.  Patient to follow-up with GI as outpatient for management of chronic hepatitis C virus infection       Principal Problem:    Calculus of gallbladder with acute cholecystitis without obstruction  Active Problems:    Transaminitis    Abdominal pain, epigastric  Resolved Problems:    * No resolved hospital problems. *    Patient Active Problem List   Diagnosis Code    Calculus of gallbladder with acute cholecystitis without obstruction K80.00    Transaminitis R74.01    Abdominal pain, epigastric R10.13       Thank you for involving [facility]) Gastroenterology in the hospital care of Gibran Mosqueda. For further questions or concerns, we can best be reached through perfect serv.         Lilian Blancas 12/30/20 12:21 PM EST

## 2020-12-30 NOTE — PROGRESS NOTES
Hospitalist Progress Note      PCP: No primary care provider on file. Date of Admission: 12/27/2020    Chief Complaint: Abdominal Pain    Subjective: no new c/o. Medications:  Reviewed    Infusion Medications    sodium chloride 100 mL/hr at 12/30/20 0119    dextrose       Scheduled Medications    piperacillin-tazobactam  3.375 g Intravenous Q8H    FLUoxetine  20 mg Oral BID    levothyroxine  25 mcg Oral QAM AC    losartan  75 mg Oral Daily    sodium chloride flush  10 mL Intravenous 2 times per day    enoxaparin  40 mg Subcutaneous Daily    insulin lispro  0-12 Units Subcutaneous TID WC    insulin lispro  0-6 Units Subcutaneous Nightly     PRN Meds: oxyCODONE, sodium chloride flush, acetaminophen, promethazine **OR** ondansetron, morphine, glucose, dextrose, glucagon (rDNA), dextrose      Intake/Output Summary (Last 24 hours) at 12/30/2020 0911  Last data filed at 12/29/2020 1229  Gross per 24 hour   Intake 360 ml   Output --   Net 360 ml       Physical Exam Performed:    /83   Pulse 56   Temp 97.4 °F (36.3 °C) (Oral)   Resp 18   Ht 5' 8\" (1.727 m)   Wt 189 lb 6.4 oz (85.9 kg)   SpO2 98%   BMI 28.80 kg/m²     General appearance: No apparent distress, appears stated age and cooperative. HEENT: Pupils equal, round, and reactive to light. Conjunctivae/corneas clear. Neck: Supple, with full range of motion. No jugular venous distention. Trachea midline. Respiratory:  Normal respiratory effort. Clear to auscultation, bilaterally without Rales/Wheezes/Rhonchi. Cardiovascular: Regular rate and rhythm with normal S1/S2 without murmurs, rubs or gallops. Abdomen: Soft, non-tender, non-distended with normal bowel sounds. Musculoskeletal: No clubbing, cyanosis or edema bilaterally. Full range of motion without deformity. Skin: Skin color, texture, turgor normal.  No rashes or lesions. Neurologic:  Neurovascularly intact without any focal sensory/motor deficits.  Cranial nerves: II-XII intact, grossly non-focal.  Psychiatric: Alert and oriented, thought content appropriate, normal insight  Capillary Refill: Brisk,< 3 seconds   Peripheral Pulses: +2 palpable, equal bilaterally       Labs:   Recent Labs     12/28/20  0749 12/29/20  1059 12/30/20  0658   WBC 6.5 4.7 5.0   HGB 12.6* 12.3* 12.9*   HCT 37.6* 37.3* 39.3*    290 305     Recent Labs     12/28/20  0749 12/29/20  1059 12/30/20  0658    135* 138   K 4.1 3.4* 4.0    102 101   CO2 28 27 30   BUN 11 8 8   CREATININE 1.0 0.8* 1.1   CALCIUM 8.1* 7.9* 8.8   PHOS  --  3.8 4.2     Recent Labs     12/27/20  1605 12/28/20  0749 12/29/20  1059 12/30/20  0658   * 567* 596* 630*   * 859* 799* 860*   BILIDIR 0.9*  --  1.1* 1.1*   BILITOT 1.6* 1.6* 1.6* 1.8*   ALKPHOS 324* 292* 286* 306*     Recent Labs     12/27/20  1605   INR 1.12     Recent Labs     12/27/20  1605   TROPONINI <0.01       Urinalysis:      Lab Results   Component Value Date    NITRU Negative 12/27/2020    BLOODU Negative 12/27/2020    SPECGRAV 1.015 12/27/2020    GLUCOSEU >=1000 12/27/2020       Consults:    IP CONSULT TO GENERAL SURGERY  IP CONSULT TO HOSPITALIST  IP CONSULT TO GENERAL SURGERY  IP CONSULT TO GI      Assessment/Plan:    Active Hospital Problems    Diagnosis    Transaminitis [R74.01]    Abdominal pain, epigastric [R10.13]    Calculus of gallbladder with acute cholecystitis without obstruction [K80.00]       Acute calculus cholecystitis - CT abdomen pelvis revealed Cholelithiasis with cholecystitis. Possible cystitis and/or chronic bladder outlet obstruction.  - Right upper quadrant gallbladder ultrasound revealed calculus cholecystitis  - General surgery was consulted from ED and appreciated. GI consulted and appreciated. Started on empiric Zosyn in ED 27 Dec - continued, w/out evidence of Leukocytosis/Fever. Diet advanced, NPO after Mn in case ERCP warranted.      Transaminitis - Initially thought 2nd to above.   Will continue to

## 2020-12-30 NOTE — PROGRESS NOTES
[facility]    PATIENT NAME: Rebekah Meneses     TODAY'S DATE: 12/30/2020    CHIEF COMPLAINT: RUQ pain    INTERVAL HISTORY/HPI:    Pt feels \"puny\" today, still w/ RUQ pain but not worse vs yesterday. OBJECTIVE:  VITALS:  /83   Pulse 56   Temp 97.4 °F (36.3 °C) (Oral)   Resp 18   Ht 5' 8\" (1.727 m)   Wt 189 lb 6.4 oz (85.9 kg)   SpO2 98%   BMI 28.80 kg/m²     INTAKE/OUTPUT:    I/O last 3 completed shifts: In: 1571.7 [P.O.:360; I.V.:1211.7]  Out: -   No intake/output data recorded. CONSTITUTIONAL:  awake and alert  LUNGS:     ABDOMEN:   , soft, non-distended, tenderness noted in the right upper quadrant Foote's sign is absent     Data:  CBC:   Recent Labs     12/28/20  0749 12/29/20  1059 12/30/20  0658   WBC 6.5 4.7 5.0   HGB 12.6* 12.3* 12.9*   HCT 37.6* 37.3* 39.3*    290 305     BMP:    Recent Labs     12/28/20  0749 12/29/20  1059 12/30/20  0658    135* 138   K 4.1 3.4* 4.0    102 101   CO2 28 27 30   BUN 11 8 8   CREATININE 1.0 0.8* 1.1   GLUCOSE 95 277* 108*     Hepatic:   Recent Labs     12/28/20  0749 12/29/20  1059 12/30/20  0658   * 596* 630*   * 799* 860*   BILITOT 1.6* 1.6* 1.8*   ALKPHOS 292* 286* 306*     Mag:    No results for input(s): MG in the last 72 hours. Phos:     Recent Labs     12/29/20  1059 12/30/20  0658   PHOS 3.8 4.2      INR: No results for input(s): INR in the last 72 hours.     Radiology Review:    MRI OF THE ABDOMEN WITH AND WITHOUT CONTRAST AND MRCP 12/30/2020 2:51 pm       TECHNIQUE:   Multiplanar multisequence MRI of the abdomen was performed with and without   the administration of intravenous contrast.  After initial T2 axial and   coronal images, thick slab, thin slab and 3D coronal MRCP sequences were   obtained without the administration of intravenous contrast.  MIP images are   provided for review.       COMPARISON:   12/27/2020 ultrasound and CT       HISTORY:   ORDERING SYSTEM PROVIDED HISTORY: cholelithaisis and cholecystitis. persisting abnormal LFTs. rule out choledocholithiasis   TECHNOLOGIST PROVIDED HISTORY:   Reason for exam:->cholelithaisis and cholecystitis. persisting abnormal LFTs. rule out choledocholithiasis       FINDINGS:   Gallbladder: Multiple stones are identified within the lumen of the   gallbladder.  Mucosal thickening and edema is present measuring up to 8 mm. There is also a small amount of pericholecystic fluid.       Bile Ducts: The intrahepatic biliary ducts are normal.  The cystic duct   appears normal without dilatation.  Common bile duct is normal with no   intraluminal filling defect appreciated.       Pancreatic Duct: The pancreatic duct is normal.       Other:  Incidental cysts are identified within the spleen and left kidney. No other significant finding in the abdomen.           Impression   1. Spectrum of findings correlating to recent ultrasound suggesting acute   cholecystitis. 2. No evidence of choledocholithiasis.               ASSESSMENT AND PLAN:  Acute cholecystitis w/o signs of choledocholithiasis. Discussed w/ GI - doubt acute hepatitis is primary pathology at this time. Based on MRCP results today, confirms absence of CBD stones. - with severity of cholecystitis seen on imaging, would still prefer not to attempt surgical intervention at this point in time. - if no significant improvement in symptoms tomorrow, will discuss w/ Int Rad for percutaneous cholecystostomy, to help reduce inflammation and allow for delayed interval cholecystectomy in 2-3 weeks.     Electronically signed by Denisa Finney MD

## 2020-12-31 LAB
ALBUMIN SERPL-MCNC: 3.5 G/DL (ref 3.4–5)
ALP BLD-CCNC: 326 U/L (ref 40–129)
ALT SERPL-CCNC: 800 U/L (ref 10–40)
ANION GAP SERPL CALCULATED.3IONS-SCNC: 5 MMOL/L (ref 3–16)
AST SERPL-CCNC: 592 U/L (ref 15–37)
BILIRUB SERPL-MCNC: 1.8 MG/DL (ref 0–1)
BILIRUBIN DIRECT: 1.1 MG/DL (ref 0–0.3)
BILIRUBIN, INDIRECT: 0.7 MG/DL (ref 0–1)
BUN BLDV-MCNC: 11 MG/DL (ref 7–20)
CALCIUM SERPL-MCNC: 8.3 MG/DL (ref 8.3–10.6)
CHLORIDE BLD-SCNC: 98 MMOL/L (ref 99–110)
CO2: 29 MMOL/L (ref 21–32)
CREAT SERPL-MCNC: 1 MG/DL (ref 0.9–1.3)
GFR AFRICAN AMERICAN: >60
GFR NON-AFRICAN AMERICAN: >60
GLUCOSE BLD-MCNC: 146 MG/DL (ref 70–99)
GLUCOSE BLD-MCNC: 148 MG/DL (ref 70–99)
GLUCOSE BLD-MCNC: 164 MG/DL (ref 70–99)
GLUCOSE BLD-MCNC: 196 MG/DL (ref 70–99)
GLUCOSE BLD-MCNC: 299 MG/DL (ref 70–99)
HCT VFR BLD CALC: 39 % (ref 40.5–52.5)
HEMOGLOBIN: 12.9 G/DL (ref 13.5–17.5)
MCH RBC QN AUTO: 29.9 PG (ref 26–34)
MCHC RBC AUTO-ENTMCNC: 33 G/DL (ref 31–36)
MCV RBC AUTO: 90.8 FL (ref 80–100)
PDW BLD-RTO: 15.2 % (ref 12.4–15.4)
PERFORMED ON: ABNORMAL
PHOSPHORUS: 4.3 MG/DL (ref 2.5–4.9)
PLATELET # BLD: 288 K/UL (ref 135–450)
PMV BLD AUTO: 7.8 FL (ref 5–10.5)
POTASSIUM SERPL-SCNC: 3.5 MMOL/L (ref 3.5–5.1)
RBC # BLD: 4.29 M/UL (ref 4.2–5.9)
SODIUM BLD-SCNC: 132 MMOL/L (ref 136–145)
TOTAL PROTEIN: 6.4 G/DL (ref 6.4–8.2)
WBC # BLD: 4.7 K/UL (ref 4–11)

## 2020-12-31 PROCEDURE — 6370000000 HC RX 637 (ALT 250 FOR IP): Performed by: NURSE PRACTITIONER

## 2020-12-31 PROCEDURE — 80076 HEPATIC FUNCTION PANEL: CPT

## 2020-12-31 PROCEDURE — 80069 RENAL FUNCTION PANEL: CPT

## 2020-12-31 PROCEDURE — 1200000000 HC SEMI PRIVATE

## 2020-12-31 PROCEDURE — 99231 SBSQ HOSP IP/OBS SF/LOW 25: CPT | Performed by: SURGERY

## 2020-12-31 PROCEDURE — APPSS45 APP SPLIT SHARED TIME 31-45 MINUTES: Performed by: CLINICAL NURSE SPECIALIST

## 2020-12-31 PROCEDURE — 36415 COLL VENOUS BLD VENIPUNCTURE: CPT

## 2020-12-31 PROCEDURE — 85027 COMPLETE CBC AUTOMATED: CPT

## 2020-12-31 PROCEDURE — 6360000002 HC RX W HCPCS: Performed by: NURSE PRACTITIONER

## 2020-12-31 PROCEDURE — 2580000003 HC RX 258: Performed by: NURSE PRACTITIONER

## 2020-12-31 PROCEDURE — 99231 SBSQ HOSP IP/OBS SF/LOW 25: CPT | Performed by: INTERNAL MEDICINE

## 2020-12-31 RX ADMIN — Medication 10 ML: at 20:08

## 2020-12-31 RX ADMIN — MORPHINE SULFATE 2 MG: 2 INJECTION, SOLUTION INTRAMUSCULAR; INTRAVENOUS at 06:41

## 2020-12-31 RX ADMIN — MORPHINE SULFATE 2 MG: 2 INJECTION, SOLUTION INTRAMUSCULAR; INTRAVENOUS at 17:01

## 2020-12-31 RX ADMIN — SODIUM CHLORIDE: 900 INJECTION INTRAVENOUS at 10:49

## 2020-12-31 RX ADMIN — LEVOTHYROXINE SODIUM 25 MCG: 0.03 TABLET ORAL at 06:35

## 2020-12-31 RX ADMIN — PIPERACILLIN AND TAZOBACTAM 3.38 G: 3; .375 INJECTION, POWDER, FOR SOLUTION INTRAVENOUS at 10:45

## 2020-12-31 RX ADMIN — OXYCODONE 5 MG: 5 TABLET ORAL at 08:34

## 2020-12-31 RX ADMIN — LOSARTAN POTASSIUM 75 MG: 25 TABLET, FILM COATED ORAL at 08:29

## 2020-12-31 RX ADMIN — PIPERACILLIN AND TAZOBACTAM 3.38 G: 3; .375 INJECTION, POWDER, FOR SOLUTION INTRAVENOUS at 18:03

## 2020-12-31 RX ADMIN — SODIUM CHLORIDE: 900 INJECTION INTRAVENOUS at 20:09

## 2020-12-31 RX ADMIN — INSULIN LISPRO 1 UNITS: 100 INJECTION, SOLUTION INTRAVENOUS; SUBCUTANEOUS at 21:43

## 2020-12-31 RX ADMIN — INSULIN LISPRO 6 UNITS: 100 INJECTION, SOLUTION INTRAVENOUS; SUBCUTANEOUS at 17:01

## 2020-12-31 RX ADMIN — MORPHINE SULFATE 2 MG: 2 INJECTION, SOLUTION INTRAMUSCULAR; INTRAVENOUS at 20:08

## 2020-12-31 RX ADMIN — PIPERACILLIN AND TAZOBACTAM 3.38 G: 3; .375 INJECTION, POWDER, FOR SOLUTION INTRAVENOUS at 02:25

## 2020-12-31 RX ADMIN — MORPHINE SULFATE 2 MG: 2 INJECTION, SOLUTION INTRAMUSCULAR; INTRAVENOUS at 23:42

## 2020-12-31 RX ADMIN — OXYCODONE 5 MG: 5 TABLET ORAL at 21:43

## 2020-12-31 RX ADMIN — FLUOXETINE HYDROCHLORIDE 20 MG: 20 CAPSULE ORAL at 20:11

## 2020-12-31 RX ADMIN — FLUOXETINE HYDROCHLORIDE 20 MG: 20 CAPSULE ORAL at 08:29

## 2020-12-31 ASSESSMENT — PAIN SCALES - GENERAL
PAINLEVEL_OUTOF10: 8
PAINLEVEL_OUTOF10: 9

## 2020-12-31 NOTE — PROGRESS NOTES
[facility] GENERAL SURGERY    PATIENT NAME: Tammy Mendez     TODAY'S DATE: 12/31/2020    CHIEF COMPLAINT: RUQ pain    INTERVAL HISTORY/HPI:    Pt reports he has RUQ soreness, no nausea at present. He reports he is hungry today. OBJECTIVE:  VITALS:  BP (!) 145/85   Pulse 59   Temp 97.5 °F (36.4 °C) (Oral)   Resp 16   Ht 5' 8\" (1.727 m)   Wt 189 lb 6.4 oz (85.9 kg)   SpO2 95%   BMI 28.80 kg/m²     INTAKE/OUTPUT:    I/O last 3 completed shifts: In: 4208.7 [P.O.:840; I.V.:3368.7]  Out: 0   No intake/output data recorded. CONSTITUTIONAL:  awake and alert  LUNGS:  No crackles or wheezes   ABDOMEN:   soft, non-distended, tenderness noted in the right upper quadrant is mild and Foote's sign is absent     Data:  CBC:   Recent Labs     12/29/20  1059 12/30/20  0658 12/31/20  0741   WBC 4.7 5.0 4.7   HGB 12.3* 12.9* 12.9*   HCT 37.3* 39.3* 39.0*    305 288     BMP:    Recent Labs     12/29/20  1059 12/30/20  0658 12/31/20  0741   * 138 132*   K 3.4* 4.0 3.5    101 98*   CO2 27 30 29   BUN 8 8 11   CREATININE 0.8* 1.1 1.0   GLUCOSE 277* 108* 164*     Hepatic:   Recent Labs     12/29/20  1059 12/30/20  0658 12/31/20  0741   * 630* 592*   * 860* 800*   BILITOT 1.6* 1.8* 1.8*   ALKPHOS 286* 306* 326*     Mag:    No results for input(s): MG in the last 72 hours. Phos:     Recent Labs     12/29/20  1059 12/30/20  0658 12/31/20  0741   PHOS 3.8 4.2 4.3          ASSESSMENT AND PLAN:  Acute cholecystitis w/o signs of choledocholithiasis. Discussed w/ GI yesterday - doubt acute hepatitis is primary pathology at this time. Lfts essentially unchanged, but pt without leukocytosis or fever. Mild tenderness. Continue with IV antibiotics. Discuss with Dr. Antolin Sinclair. perc drain vs trial of diet today     Electronically signed by COLTON Galeano - CNP     Surgery Staff    I have examined this patient and read and agree with the note by Ashok Rubin CNP from today.   Seems to be slowly improving.   Will trial advancing diet slowly - if tolerated, can be d/c'd home in 1-2 days    Fabián Mar

## 2020-12-31 NOTE — PROGRESS NOTES
BMI 28.80 kg/m²   Wt Readings from Last 3 Encounters:   12/27/20 189 lb 6.4 oz (85.9 kg)     Constitutional: Well developed, Well nourished, No acute distress, Non-toxic appearance. HENT: Normocephalic, Atraumatic, Bilateral external ears normal, Oropharynx moist, No oral exudates, Nose normal.   Eyes: Conjunctiva normal, No discharge. Neck: Normal range of motion, No tenderness, Supple, No stridor. Lymphatic: No cervical, subclavian, or axillary lymphadenopathy. Cardiovascular: Normal heart rate, Normal rhythm, No murmurs, No rubs, No gallops. Thorax & Lungs: Normal breath sounds, No respiratory distress, No wheezing, No chest tenderness. Abdomen: normal bowel sounds, soft, mild tenderness in the right upper quadrant, non distended, no hernias. Rectal:  Deferred. Skin: Warm, Dry, No erythema, No rash. No bruising. No spider hemangiomas. Back: No tenderness, No CVA tenderness. Lower Extremities: Intact distal pulses, No edema, No tenderness, No cyanosis, No clubbing. Neurologic: Alert & oriented x 3, Normal motor function, Normal sensory function, No focal deficits noted. Lab Results   Component Value Date     (H) 12/30/2020     (H) 12/30/2020    ALKPHOS 306 (H) 12/30/2020    BILIDIR 1.1 (H) 12/30/2020    PROT 6.4 12/30/2020    LABALBU 3.5 12/30/2020    INR 1.12 12/27/2020    LIPASE 35.0 12/28/2020     Lab Results   Component Value Date    WBC 5.0 12/30/2020    HGB 12.9 (L) 12/30/2020    HCT 39.3 (L) 12/30/2020    MCV 91.1 12/30/2020     12/30/2020     Lab Results   Component Value Date     12/30/2020    K 4.0 12/30/2020    K 4.1 12/28/2020     12/30/2020    CO2 30 12/30/2020    BUN 8 12/30/2020     Lab Results   Component Value Date    CREATININE 1.1 12/30/2020     MRI abdomen/MRCP 12/30/2020:   1. Spectrum of findings correlating to recent ultrasound suggesting acute   cholecystitis. 2. No evidence of choledocholithiasis.        A/P  1.  Abdominal pain with abnormal liver chemistries (mixed pattern with hepatocellular predominance) due to acute cholecystitis.  No evidence of choledocholithiasis on US/CT/MRCP.  Ruled out acute pancreatitis and acute hepatitis A and B viral infection. 2.  Chronic hepatitis C virus infection      Plan:  Continue IV antibiotics for cholecystitis.   Surgery on board for interval cholecystectomy  Continue to monitor LFTs, CBC  Clear liquid diet for now  Patient to follow-up with GI as outpatient for management of chronic hepatitis C virus infection  GI to sign off. Recall as needed    Principal Problem:    Calculus of gallbladder with acute cholecystitis without obstruction  Active Problems:    Transaminitis    Abdominal pain, epigastric  Resolved Problems:    * No resolved hospital problems. *    Patient Active Problem List   Diagnosis Code    Calculus of gallbladder with acute cholecystitis without obstruction K80.00    Transaminitis R74.01    Abdominal pain, epigastric R10.13       Thank you for involving [facility]) Gastroenterology in the hospital care of [facility]. For further questions or concerns, we can best be reached through perfect serv.         Raissa Cadena 12/31/20 7:44 AM EST

## 2020-12-31 NOTE — PROGRESS NOTES
Neurovascularly intact without any focal sensory/motor deficits. Cranial nerves: II-XII intact, grossly non-focal.  Psychiatric: Alert and oriented, thought content appropriate, normal insight  Capillary Refill: Brisk,< 3 seconds   Peripheral Pulses: +2 palpable, equal bilaterally       Labs:   Recent Labs     12/29/20  1059 12/30/20  0658 12/31/20  0741   WBC 4.7 5.0 4.7   HGB 12.3* 12.9* 12.9*   HCT 37.3* 39.3* 39.0*    305 288     Recent Labs     12/29/20  1059 12/30/20  0658 12/31/20  0741   * 138 132*   K 3.4* 4.0 3.5    101 98*   CO2 27 30 29   BUN 8 8 11   CREATININE 0.8* 1.1 1.0   CALCIUM 7.9* 8.8 8.3   PHOS 3.8 4.2 4.3     Recent Labs     12/29/20  1059 12/30/20  0658 12/31/20  0741   * 630* 592*   * 860* 800*   BILIDIR 1.1* 1.1* 1.1*   BILITOT 1.6* 1.8* 1.8*   ALKPHOS 286* 306* 326*     No results for input(s): INR in the last 72 hours. No results for input(s): Arden Ripper in the last 72 hours. Urinalysis:      Lab Results   Component Value Date    NITRU Negative 12/27/2020    BLOODU Negative 12/27/2020    SPECGRAV 1.015 12/27/2020    GLUCOSEU >=1000 12/27/2020       Consults:    IP CONSULT TO GENERAL SURGERY  IP CONSULT TO HOSPITALIST  IP CONSULT TO GENERAL SURGERY  IP CONSULT TO GI      Assessment/Plan:    Active Hospital Problems    Diagnosis    Transaminitis [R74.01]    Abdominal pain, epigastric [R10.13]    Calculus of gallbladder with acute cholecystitis without obstruction [K80.00]       Acute acalculus cholecystitis - of unclear etiology. CT abdomen pelvis revealed  Acute Cholecystitis w/ Cholelithiasis but no dilated duct c/w choledocholithiasis. RUQ U/S  revealed cholecystitis w/ cholelithiasis. General Surgery consulted from ED and appreciated. GI consulted and appreciated. Started on empiric Zosyn in ED 27 Dec - continued, w/out evidence of Leukocytosis/Fever. Diet advanced but poorly tolerated w/ recurrent pain.   Now NPO for possibl ERCP  but MRCP 30 Dec w/out evidence of choledocholithiasis.      Transaminitis - Initially thought 2nd to above. Will continue to follow serial labs - persistent but stable. Reviewed and documented as above. MRCP ordered and pending.      HTN - w/out known CAD and no evidence of active signs/sxs of ischemia/failure. Currently controlled on home meds w/ vitals reviewed and documented as above. HyperLipidemia - controlled on home Statin. Held w/ elevated LFTs. Continue f/u and med adjustment w/ PCP    DM2 - controlled on home oral antiGlycemics - held. Follow FSBS/SSI medium regimen. Last HbA1c N/A. Anticipate resuming/continuing home regimen at discharge.         DVT Prophylaxis: LMWH/IPC  Diet: Diet NPO Time Specified Exceptions are: Sips with Meds  Code Status: Full Code      PT/OT Eval Status: not yet ordered. Dispo - possibly Friday 1 Jan at the earliest pending clinical course and subspecialty recs.      Jim Conde MD

## 2020-12-31 NOTE — PROGRESS NOTES
recommendation at this time   Coordination of Nutrition Care:  Continue to monitor while inpatient    Goals:  Pt will tolerate and consume greater than 50% of meals and ONS this admission w/o GI distress       Nutrition Monitoring and Evaluation:   Food/Nutrient Intake Outcomes:  Food and Nutrient Intake, Supplement Intake, Diet Advancement/Tolerance  Physical Signs/Symptoms Outcomes:  Biochemical Data, GI Status, Weight     Discharge Planning:     Too soon to determine     Electronically signed by Jett Andrade MS, RD, LD on 12/31/20 at 1:18 PM EST    Contact: [phone]

## 2020-12-31 NOTE — DISCHARGE INSTR - DIET
 Good nutrition is important when healing from an illness, injury, or surgery. Follow any nutrition recommendations given to you during your hospital stay.  If you were given an oral nutrition supplement while in the hospital, continue to take this supplement at home. You can take it with meals, in-between meals, and/or before bedtime. These supplements can be purchased at most local grocery stores, pharmacies, and chain super-stores.  If you have any questions about your diet or nutrition, call the hospital and ask for the dietitian. Fat-Restricted Nutrition Therapy    A fat-restricted diet can help if you have trouble digesting or absorbing fat. This nutrition therapy will help prevent uncomfortable side effects, such as diarrhea, bloating, and cramping, that may occur when you consume high-fat foods. In addition, this nutrition therapy may help you absorb important nutrients in your diet. Important Points to Keep in Mind  Keep the total amount of fat that you eat (including heart-healthy fats) to 25% to 35% of the calories that you eat. If you should eat 2,000 calories per day, you can have between 50 and 75 grams of fat per day. Limit saturated fats and trans fats:   Foods high in saturated fats include marbled (fatty) meat, poultry skin, mancuso, sausage, whole milk, cream, and butter. Trans fats are found in stick margarine, shortening, some fried foods, baked goods, pastries, and packaged foods made with hydrogenated oils. If you eat these foods, have them only once in a while and in small amounts. Try to use oils instead of butter or stick margarine whenever possible, or use reduced-fat, whipped, or liquid spreads. Lactose (the sugar in milk), dairy products, and dietary fiber in foods can also cause diarrhea, bloating, and cramping. Keeping a food journal and recording your symptoms may help you better understand which foods are causing your symptoms.        Foods Recommended  Food Oils Butter  Stick margarine  Shortening  Partially hydrogenated oils  Tropical oils (coconut, palm, and palm kernel oils)         Fat-Restricted Sample 1-Day Menu   Breakfast 1/2 cup apple juice   3/4 cup oatmeal   1 small banana   1 cup fat-free milk   1 cup brewed coffee   Lunch 2 slices whole wheat bread   2 ounces lean deli turkey breast   1 ounce low-fat Swiss cheese   Mustard   1 medium sliced tomato   Shredded lettuce   1 pear   1 cup fat-free milk   Evening Meal 3 ounces broiled fish   1 cup brown rice   1 teaspoon soft margarine (for rice)   1 medium stalk cooked broccoli   Olive oil and vinegar dressing (for salad)   1 small whole grain roll   1 teaspoon soft margarine (for roll)   1 cup tea   1/2 cup fat-free frozen yogurt with fruit   Evening Snack 1 ounce trail mix with nuts, seeds, dried fruit   1 cup blueberries   1 cup fat-free milk

## 2020-12-31 NOTE — CARE COORDINATION
Per conversation with primary nurse at 1100 huddle, plan is for patient to continue with IV antibiotics and possible per drain tomorrow. IPTA. 1:50 PM  General surgery note reviewed. Perc drain vs trial of diet today. NN from CM team. Please consult CM team if needs arise.      Jacob Soler RN

## 2021-01-01 LAB
ALBUMIN SERPL-MCNC: 3.7 G/DL (ref 3.4–5)
ALP BLD-CCNC: 333 U/L (ref 40–129)
ALT SERPL-CCNC: 697 U/L (ref 10–40)
ANION GAP SERPL CALCULATED.3IONS-SCNC: 10 MMOL/L (ref 3–16)
AST SERPL-CCNC: 480 U/L (ref 15–37)
BILIRUB SERPL-MCNC: 1.7 MG/DL (ref 0–1)
BILIRUBIN DIRECT: 0.9 MG/DL (ref 0–0.3)
BILIRUBIN, INDIRECT: 0.8 MG/DL (ref 0–1)
BUN BLDV-MCNC: 9 MG/DL (ref 7–20)
CALCIUM SERPL-MCNC: 8.7 MG/DL (ref 8.3–10.6)
CHLORIDE BLD-SCNC: 99 MMOL/L (ref 99–110)
CO2: 29 MMOL/L (ref 21–32)
CREAT SERPL-MCNC: 0.9 MG/DL (ref 0.9–1.3)
GFR AFRICAN AMERICAN: >60
GFR NON-AFRICAN AMERICAN: >60
GLUCOSE BLD-MCNC: 167 MG/DL (ref 70–99)
GLUCOSE BLD-MCNC: 190 MG/DL (ref 70–99)
GLUCOSE BLD-MCNC: 197 MG/DL (ref 70–99)
GLUCOSE BLD-MCNC: 215 MG/DL (ref 70–99)
GLUCOSE BLD-MCNC: 263 MG/DL (ref 70–99)
HCT VFR BLD CALC: 38.4 % (ref 40.5–52.5)
HEMOGLOBIN: 12.7 G/DL (ref 13.5–17.5)
MAGNESIUM: 2 MG/DL (ref 1.8–2.4)
MCH RBC QN AUTO: 30.2 PG (ref 26–34)
MCHC RBC AUTO-ENTMCNC: 33.2 G/DL (ref 31–36)
MCV RBC AUTO: 91.1 FL (ref 80–100)
PDW BLD-RTO: 15.2 % (ref 12.4–15.4)
PERFORMED ON: ABNORMAL
PHOSPHORUS: 3.3 MG/DL (ref 2.5–4.9)
PLATELET # BLD: 292 K/UL (ref 135–450)
PMV BLD AUTO: 7.9 FL (ref 5–10.5)
POTASSIUM SERPL-SCNC: 3.6 MMOL/L (ref 3.5–5.1)
RBC # BLD: 4.22 M/UL (ref 4.2–5.9)
SODIUM BLD-SCNC: 138 MMOL/L (ref 136–145)
TOTAL PROTEIN: 6.6 G/DL (ref 6.4–8.2)
WBC # BLD: 4.8 K/UL (ref 4–11)

## 2021-01-01 PROCEDURE — 6360000002 HC RX W HCPCS: Performed by: NURSE PRACTITIONER

## 2021-01-01 PROCEDURE — 99232 SBSQ HOSP IP/OBS MODERATE 35: CPT | Performed by: SURGERY

## 2021-01-01 PROCEDURE — 80069 RENAL FUNCTION PANEL: CPT

## 2021-01-01 PROCEDURE — 6370000000 HC RX 637 (ALT 250 FOR IP): Performed by: NURSE PRACTITIONER

## 2021-01-01 PROCEDURE — 80076 HEPATIC FUNCTION PANEL: CPT

## 2021-01-01 PROCEDURE — 2580000003 HC RX 258: Performed by: NURSE PRACTITIONER

## 2021-01-01 PROCEDURE — 83735 ASSAY OF MAGNESIUM: CPT

## 2021-01-01 PROCEDURE — 6370000000 HC RX 637 (ALT 250 FOR IP): Performed by: INTERNAL MEDICINE

## 2021-01-01 PROCEDURE — 36415 COLL VENOUS BLD VENIPUNCTURE: CPT

## 2021-01-01 PROCEDURE — 6360000002 HC RX W HCPCS: Performed by: INTERNAL MEDICINE

## 2021-01-01 PROCEDURE — 85027 COMPLETE CBC AUTOMATED: CPT

## 2021-01-01 PROCEDURE — 1200000000 HC SEMI PRIVATE

## 2021-01-01 RX ORDER — HYDROMORPHONE HCL 110MG/55ML
1 PATIENT CONTROLLED ANALGESIA SYRINGE INTRAVENOUS EVERY 4 HOURS PRN
Status: DISCONTINUED | OUTPATIENT
Start: 2021-01-01 | End: 2021-01-03 | Stop reason: HOSPADM

## 2021-01-01 RX ORDER — OXYCODONE HYDROCHLORIDE 5 MG/1
10 TABLET ORAL EVERY 4 HOURS PRN
Status: DISCONTINUED | OUTPATIENT
Start: 2021-01-01 | End: 2021-01-03 | Stop reason: HOSPADM

## 2021-01-01 RX ORDER — OXYCODONE HYDROCHLORIDE 5 MG/1
5 TABLET ORAL EVERY 4 HOURS PRN
Status: DISCONTINUED | OUTPATIENT
Start: 2021-01-01 | End: 2021-01-03 | Stop reason: HOSPADM

## 2021-01-01 RX ORDER — HYDROMORPHONE HCL 110MG/55ML
0.5 PATIENT CONTROLLED ANALGESIA SYRINGE INTRAVENOUS EVERY 4 HOURS PRN
Status: DISCONTINUED | OUTPATIENT
Start: 2021-01-01 | End: 2021-01-03 | Stop reason: HOSPADM

## 2021-01-01 RX ADMIN — INSULIN LISPRO 2 UNITS: 100 INJECTION, SOLUTION INTRAVENOUS; SUBCUTANEOUS at 18:00

## 2021-01-01 RX ADMIN — INSULIN LISPRO 2 UNITS: 100 INJECTION, SOLUTION INTRAVENOUS; SUBCUTANEOUS at 13:17

## 2021-01-01 RX ADMIN — HYDROMORPHONE HYDROCHLORIDE 1 MG: 2 INJECTION INTRAMUSCULAR; INTRAVENOUS; SUBCUTANEOUS at 13:16

## 2021-01-01 RX ADMIN — MORPHINE SULFATE 2 MG: 2 INJECTION, SOLUTION INTRAMUSCULAR; INTRAVENOUS at 05:13

## 2021-01-01 RX ADMIN — LEVOTHYROXINE SODIUM 25 MCG: 0.03 TABLET ORAL at 05:13

## 2021-01-01 RX ADMIN — SODIUM CHLORIDE: 900 INJECTION INTRAVENOUS at 07:36

## 2021-01-01 RX ADMIN — Medication 10 ML: at 07:36

## 2021-01-01 RX ADMIN — FLUOXETINE HYDROCHLORIDE 20 MG: 20 CAPSULE ORAL at 07:37

## 2021-01-01 RX ADMIN — Medication 10 ML: at 20:47

## 2021-01-01 RX ADMIN — HYDROMORPHONE HYDROCHLORIDE 1 MG: 2 INJECTION INTRAMUSCULAR; INTRAVENOUS; SUBCUTANEOUS at 21:17

## 2021-01-01 RX ADMIN — MORPHINE SULFATE 2 MG: 2 INJECTION, SOLUTION INTRAMUSCULAR; INTRAVENOUS at 10:37

## 2021-01-01 RX ADMIN — LOSARTAN POTASSIUM 75 MG: 25 TABLET, FILM COATED ORAL at 07:36

## 2021-01-01 RX ADMIN — SODIUM CHLORIDE: 900 INJECTION INTRAVENOUS at 20:47

## 2021-01-01 RX ADMIN — PIPERACILLIN AND TAZOBACTAM 3.38 G: 3; .375 INJECTION, POWDER, FOR SOLUTION INTRAVENOUS at 17:59

## 2021-01-01 RX ADMIN — INSULIN LISPRO 2 UNITS: 100 INJECTION, SOLUTION INTRAVENOUS; SUBCUTANEOUS at 21:17

## 2021-01-01 RX ADMIN — FLUOXETINE HYDROCHLORIDE 20 MG: 20 CAPSULE ORAL at 20:46

## 2021-01-01 RX ADMIN — INSULIN LISPRO 6 UNITS: 100 INJECTION, SOLUTION INTRAVENOUS; SUBCUTANEOUS at 10:38

## 2021-01-01 RX ADMIN — PIPERACILLIN AND TAZOBACTAM 3.38 G: 3; .375 INJECTION, POWDER, FOR SOLUTION INTRAVENOUS at 02:56

## 2021-01-01 RX ADMIN — PIPERACILLIN AND TAZOBACTAM 3.38 G: 3; .375 INJECTION, POWDER, FOR SOLUTION INTRAVENOUS at 11:15

## 2021-01-01 RX ADMIN — OXYCODONE 5 MG: 5 TABLET ORAL at 07:36

## 2021-01-01 RX ADMIN — OXYCODONE 10 MG: 5 TABLET ORAL at 15:46

## 2021-01-01 ASSESSMENT — PAIN DESCRIPTION - LOCATION: LOCATION: ABDOMEN

## 2021-01-01 ASSESSMENT — PAIN SCALES - GENERAL
PAINLEVEL_OUTOF10: 2
PAINLEVEL_OUTOF10: 8
PAINLEVEL_OUTOF10: 8

## 2021-01-01 NOTE — PROGRESS NOTES
[facility] SURGERY    PATIENT NAME: Lisa Bolden     TODAY'S DATE: 1/1/2021    CHIEF COMPLAINT: abd pain    INTERVAL HISTORY/HPI:    Pt with mild pain, denies nausea or emesis, no fevers. REVIEW OF SYSTEMS:  Pertinent positives and negatives as per interval history section    OBJECTIVE:  VITALS:  /70   Pulse 59   Temp 97.5 °F (36.4 °C) (Oral)   Resp 16   Ht 5' 8\" (1.727 m)   Wt 189 lb 6.4 oz (85.9 kg)   SpO2 94%   BMI 28.80 kg/m²     INTAKE/OUTPUT:    I/O last 3 completed shifts: In: 1640 [P.O.:1640]  Out: 0   I/O this shift:  In: 600 [P.O.:600]  Out: -     CONSTITUTIONAL:  awake and alert  LUNGS:  Respirations easy and unlabored, no crackles or wheezing  CARD:  regular rate and rhythm  ABDOMEN:  normal bowel sounds, soft, non-distended, mild RUQ tender     Data:  CBC:   Recent Labs     12/30/20  0658 12/31/20  0741 01/01/21  1105   WBC 5.0 4.7 4.8   HGB 12.9* 12.9* 12.7*   HCT 39.3* 39.0* 38.4*    288 292     BMP:    Recent Labs     12/30/20  0658 12/31/20  0741 01/01/21  1106    132* 138   K 4.0 3.5 3.6    98* 99   CO2 30 29 29   BUN 8 11 9   CREATININE 1.1 1.0 0.9   GLUCOSE 108* 164* 190*     Hepatic:   Recent Labs     12/30/20  0658 12/31/20  0741 01/01/21  1106   * 592* 480*   * 800* 697*   BILITOT 1.8* 1.8* 1.7*   ALKPHOS 306* 326* 333*     Mag:      Recent Labs     01/01/21  1106   MG 2.00      Phos:     Recent Labs     12/30/20  0658 12/31/20  0741 01/01/21  1106   PHOS 4.2 4.3 3.3      INR: No results for input(s): INR in the last 72 hours. Radiology Review:  *Imaging personally reviewed by me. NA      ASSESSMENT AND PLAN:  51 yo with cholecystitis  1. Labs improving  2. Low fat diet  3. Continue abx  4.   Home tomorrow if tolerates diet     Electronically signed by Andrea Rae MD     [id number]

## 2021-01-01 NOTE — PROGRESS NOTES
Hospitalist Progress Note      PCP: No primary care provider on file. Date of Admission: 12/27/2020    Chief Complaint: Abdominal Pain    Subjective: no new c/o. Medications:  Reviewed    Infusion Medications    sodium chloride 100 mL/hr at 01/01/21 0736    dextrose       Scheduled Medications    influenza virus vaccine  0.5 mL Intramuscular Prior to discharge    piperacillin-tazobactam  3.375 g Intravenous Q8H    FLUoxetine  20 mg Oral BID    levothyroxine  25 mcg Oral QAM AC    losartan  75 mg Oral Daily    sodium chloride flush  10 mL Intravenous 2 times per day    enoxaparin  40 mg Subcutaneous Daily    insulin lispro  0-12 Units Subcutaneous TID WC    insulin lispro  0-6 Units Subcutaneous Nightly     PRN Meds: oxyCODONE, sodium chloride flush, acetaminophen, promethazine **OR** ondansetron, morphine, glucose, dextrose, glucagon (rDNA), dextrose      Intake/Output Summary (Last 24 hours) at 1/1/2021 0847  Last data filed at 1/1/2021 0132  Gross per 24 hour   Intake 1640 ml   Output 0 ml   Net 1640 ml       Physical Exam Performed:    /70   Pulse 59   Temp 97.5 °F (36.4 °C) (Oral)   Resp 16   Ht 5' 8\" (1.727 m)   Wt 189 lb 6.4 oz (85.9 kg)   SpO2 94%   BMI 28.80 kg/m²     General appearance: No apparent distress, appears stated age and cooperative. HEENT: Pupils equal, round, and reactive to light. Conjunctivae/corneas clear. Neck: Supple, with full range of motion. No jugular venous distention. Trachea midline. Respiratory:  Normal respiratory effort. Clear to auscultation, bilaterally without Rales/Wheezes/Rhonchi. Cardiovascular: Regular rate and rhythm with normal S1/S2 without murmurs, rubs or gallops. Abdomen: Soft, non-tender, non-distended with normal bowel sounds. Musculoskeletal: No clubbing, cyanosis or edema bilaterally. Full range of motion without deformity. Skin: Skin color, texture, turgor normal.  No rashes or lesions.   Neurologic:  Neurovascularly intact without any focal sensory/motor deficits. Cranial nerves: II-XII intact, grossly non-focal.  Psychiatric: Alert and oriented, thought content appropriate, normal insight  Capillary Refill: Brisk,< 3 seconds   Peripheral Pulses: +2 palpable, equal bilaterally       Labs:   Recent Labs     12/29/20  1059 12/30/20  0658 12/31/20  0741   WBC 4.7 5.0 4.7   HGB 12.3* 12.9* 12.9*   HCT 37.3* 39.3* 39.0*    305 288     Recent Labs     12/29/20  1059 12/30/20  0658 12/31/20  0741   * 138 132*   K 3.4* 4.0 3.5    101 98*   CO2 27 30 29   BUN 8 8 11   CREATININE 0.8* 1.1 1.0   CALCIUM 7.9* 8.8 8.3   PHOS 3.8 4.2 4.3     Recent Labs     12/29/20  1059 12/30/20  0658 12/31/20  0741   * 630* 592*   * 860* 800*   BILIDIR 1.1* 1.1* 1.1*   BILITOT 1.6* 1.8* 1.8*   ALKPHOS 286* 306* 326*     No results for input(s): INR in the last 72 hours. No results for input(s): Audelia Lacks in the last 72 hours. Urinalysis:      Lab Results   Component Value Date    NITRU Negative 12/27/2020    BLOODU Negative 12/27/2020    SPECGRAV 1.015 12/27/2020    GLUCOSEU >=1000 12/27/2020       Consults:    IP CONSULT TO GENERAL SURGERY  IP CONSULT TO HOSPITALIST  IP CONSULT TO GENERAL SURGERY  IP CONSULT TO GI      Assessment/Plan:    Active Hospital Problems    Diagnosis    Transaminitis [R74.01]    Abdominal pain, epigastric [R10.13]    Calculus of gallbladder with acute cholecystitis without obstruction [K80.00]       Acute acalculus cholecystitis - of unclear etiology. CT abdomen pelvis revealed  Acute Cholecystitis w/ Cholelithiasis but no dilated duct c/w choledocholithiasis. RUQ U/S  revealed cholecystitis w/ cholelithiasis. General Surgery consulted from ED and appreciated. GI consulted and appreciated. Started on empiric Zosyn in ED 27 Dec - continued, w/out evidence of Leukocytosis/Fever. Diet advanced but poorly tolerated w/ recurrent pain.   Now NPO for possibl ERCP  but MRCP 30 Dec w/out evidence of choledocholithiasis.      Transaminitis - Initially thought 2nd to above. Will continue to follow serial labs - persistent but stable. Reviewed and documented as above. MRCP ordered and pending.      HTN - w/out known CAD and no evidence of active signs/sxs of ischemia/failure. Currently controlled on home meds w/ vitals reviewed and documented as above. HyperLipidemia - controlled on home Statin. Held w/ elevated LFTs. Continue f/u and med adjustment w/ PCP    DM2 - controlled on home oral antiGlycemics - held. Follow FSBS/SSI medium regimen. Last HbA1c N/A. Anticipate resuming/continuing home regimen at discharge.         DVT Prophylaxis: LMWH/IPC  Diet: DIET FULL LIQUID;  Dietary Nutrition Supplements: Diabetic Oral Supplement  Code Status: Full Code      PT/OT Eval Status: not yet ordered. Dispo - possibly Friday/Sat 1/2 Jan at the earliest pending clinical course and subspecialty recs.  Clinically may be improving w/ lag in LFT's    Aubrey Arauz MD

## 2021-01-02 VITALS
OXYGEN SATURATION: 97 % | HEART RATE: 62 BPM | HEIGHT: 68 IN | SYSTOLIC BLOOD PRESSURE: 127 MMHG | BODY MASS INDEX: 28.7 KG/M2 | WEIGHT: 189.4 LBS | RESPIRATION RATE: 18 BRPM | TEMPERATURE: 98.4 F | DIASTOLIC BLOOD PRESSURE: 68 MMHG

## 2021-01-02 LAB
ALBUMIN SERPL-MCNC: 3.4 G/DL (ref 3.4–5)
ALP BLD-CCNC: 294 U/L (ref 40–129)
ALT SERPL-CCNC: 596 U/L (ref 10–40)
ANION GAP SERPL CALCULATED.3IONS-SCNC: 11 MMOL/L (ref 3–16)
AST SERPL-CCNC: 381 U/L (ref 15–37)
BILIRUB SERPL-MCNC: 1.3 MG/DL (ref 0–1)
BILIRUBIN DIRECT: 0.7 MG/DL (ref 0–0.3)
BILIRUBIN, INDIRECT: 0.6 MG/DL (ref 0–1)
BUN BLDV-MCNC: 9 MG/DL (ref 7–20)
CALCIUM SERPL-MCNC: 8.5 MG/DL (ref 8.3–10.6)
CHLORIDE BLD-SCNC: 100 MMOL/L (ref 99–110)
CO2: 25 MMOL/L (ref 21–32)
CREAT SERPL-MCNC: 0.8 MG/DL (ref 0.9–1.3)
GFR AFRICAN AMERICAN: >60
GFR NON-AFRICAN AMERICAN: >60
GLUCOSE BLD-MCNC: 208 MG/DL (ref 70–99)
GLUCOSE BLD-MCNC: 229 MG/DL (ref 70–99)
GLUCOSE BLD-MCNC: 281 MG/DL (ref 70–99)
GLUCOSE BLD-MCNC: 428 MG/DL (ref 70–99)
HCT VFR BLD CALC: 34.9 % (ref 40.5–52.5)
HEMOGLOBIN: 11.6 G/DL (ref 13.5–17.5)
MAGNESIUM: 1.8 MG/DL (ref 1.8–2.4)
MCH RBC QN AUTO: 29.9 PG (ref 26–34)
MCHC RBC AUTO-ENTMCNC: 33.3 G/DL (ref 31–36)
MCV RBC AUTO: 89.7 FL (ref 80–100)
PDW BLD-RTO: 15.1 % (ref 12.4–15.4)
PERFORMED ON: ABNORMAL
PHOSPHORUS: 3.8 MG/DL (ref 2.5–4.9)
PLATELET # BLD: 282 K/UL (ref 135–450)
PMV BLD AUTO: 8 FL (ref 5–10.5)
POTASSIUM SERPL-SCNC: 3.6 MMOL/L (ref 3.5–5.1)
RBC # BLD: 3.9 M/UL (ref 4.2–5.9)
SODIUM BLD-SCNC: 136 MMOL/L (ref 136–145)
TOTAL PROTEIN: 6 G/DL (ref 6.4–8.2)
WBC # BLD: 4.7 K/UL (ref 4–11)

## 2021-01-02 PROCEDURE — 85027 COMPLETE CBC AUTOMATED: CPT

## 2021-01-02 PROCEDURE — 1200000000 HC SEMI PRIVATE

## 2021-01-02 PROCEDURE — 6360000002 HC RX W HCPCS: Performed by: INTERNAL MEDICINE

## 2021-01-02 PROCEDURE — 2580000003 HC RX 258: Performed by: NURSE PRACTITIONER

## 2021-01-02 PROCEDURE — 36415 COLL VENOUS BLD VENIPUNCTURE: CPT

## 2021-01-02 PROCEDURE — 6360000002 HC RX W HCPCS: Performed by: NURSE PRACTITIONER

## 2021-01-02 PROCEDURE — 99232 SBSQ HOSP IP/OBS MODERATE 35: CPT | Performed by: SURGERY

## 2021-01-02 PROCEDURE — 80076 HEPATIC FUNCTION PANEL: CPT

## 2021-01-02 PROCEDURE — 80069 RENAL FUNCTION PANEL: CPT

## 2021-01-02 PROCEDURE — 6370000000 HC RX 637 (ALT 250 FOR IP): Performed by: NURSE PRACTITIONER

## 2021-01-02 PROCEDURE — 6370000000 HC RX 637 (ALT 250 FOR IP): Performed by: INTERNAL MEDICINE

## 2021-01-02 PROCEDURE — 83735 ASSAY OF MAGNESIUM: CPT

## 2021-01-02 RX ORDER — TRAMADOL HYDROCHLORIDE 50 MG/1
50 TABLET ORAL EVERY 6 HOURS PRN
Qty: 28 TABLET | Refills: 0 | Status: SHIPPED | OUTPATIENT
Start: 2021-01-02 | End: 2021-01-09

## 2021-01-02 RX ORDER — AMOXICILLIN AND CLAVULANATE POTASSIUM 875; 125 MG/1; MG/1
1 TABLET, FILM COATED ORAL 2 TIMES DAILY
Qty: 14 TABLET | Refills: 0 | Status: SHIPPED | OUTPATIENT
Start: 2021-01-02 | End: 2021-01-09

## 2021-01-02 RX ORDER — SENNA PLUS 8.6 MG/1
2 TABLET ORAL DAILY PRN
Status: DISCONTINUED | OUTPATIENT
Start: 2021-01-02 | End: 2021-01-03 | Stop reason: HOSPADM

## 2021-01-02 RX ADMIN — HYDROMORPHONE HYDROCHLORIDE 1 MG: 2 INJECTION INTRAMUSCULAR; INTRAVENOUS; SUBCUTANEOUS at 01:30

## 2021-01-02 RX ADMIN — HYDROMORPHONE HYDROCHLORIDE 1 MG: 2 INJECTION INTRAMUSCULAR; INTRAVENOUS; SUBCUTANEOUS at 05:23

## 2021-01-02 RX ADMIN — PIPERACILLIN AND TAZOBACTAM 3.38 G: 3; .375 INJECTION, POWDER, FOR SOLUTION INTRAVENOUS at 03:19

## 2021-01-02 RX ADMIN — LEVOTHYROXINE SODIUM 25 MCG: 0.03 TABLET ORAL at 05:23

## 2021-01-02 RX ADMIN — PIPERACILLIN AND TAZOBACTAM 3.38 G: 3; .375 INJECTION, POWDER, FOR SOLUTION INTRAVENOUS at 10:20

## 2021-01-02 RX ADMIN — SODIUM CHLORIDE: 900 INJECTION INTRAVENOUS at 17:46

## 2021-01-02 RX ADMIN — PIPERACILLIN AND TAZOBACTAM 3.38 G: 3; .375 INJECTION, POWDER, FOR SOLUTION INTRAVENOUS at 17:32

## 2021-01-02 RX ADMIN — OXYCODONE 10 MG: 5 TABLET ORAL at 10:32

## 2021-01-02 RX ADMIN — FLUOXETINE HYDROCHLORIDE 20 MG: 20 CAPSULE ORAL at 10:19

## 2021-01-02 RX ADMIN — OXYCODONE 10 MG: 5 TABLET ORAL at 15:03

## 2021-01-02 RX ADMIN — STANDARDIZED SENNA CONCENTRATE 17.2 MG: 8.6 TABLET ORAL at 05:32

## 2021-01-02 RX ADMIN — INSULIN LISPRO 12 UNITS: 100 INJECTION, SOLUTION INTRAVENOUS; SUBCUTANEOUS at 17:26

## 2021-01-02 RX ADMIN — SODIUM CHLORIDE: 900 INJECTION INTRAVENOUS at 06:31

## 2021-01-02 RX ADMIN — LOSARTAN POTASSIUM 75 MG: 25 TABLET, FILM COATED ORAL at 10:19

## 2021-01-02 RX ADMIN — HYDROMORPHONE HYDROCHLORIDE 1 MG: 2 INJECTION INTRAMUSCULAR; INTRAVENOUS; SUBCUTANEOUS at 17:30

## 2021-01-02 RX ADMIN — ENOXAPARIN SODIUM 40 MG: 40 INJECTION SUBCUTANEOUS at 10:16

## 2021-01-02 RX ADMIN — INSULIN LISPRO 4 UNITS: 100 INJECTION, SOLUTION INTRAVENOUS; SUBCUTANEOUS at 10:20

## 2021-01-02 ASSESSMENT — PAIN DESCRIPTION - PAIN TYPE
TYPE: ACUTE PAIN
TYPE: ACUTE PAIN

## 2021-01-02 ASSESSMENT — PAIN DESCRIPTION - LOCATION: LOCATION: ABDOMEN

## 2021-01-02 ASSESSMENT — PAIN SCALES - GENERAL
PAINLEVEL_OUTOF10: 8
PAINLEVEL_OUTOF10: 8

## 2021-01-02 NOTE — PROGRESS NOTES
[facility]    PATIENT NAME: Gibran Mosqueda     TODAY'S DATE: 1/2/2021    CHIEF COMPLAINT: abd pain    INTERVAL HISTORY/HPI:    Pt with some pain improvement, no nausea or emesis, no fevers. REVIEW OF SYSTEMS:  Pertinent positives and negatives as per interval history section    OBJECTIVE:  VITALS:  /68   Pulse 62   Temp 98.4 °F (36.9 °C) (Oral)   Resp 18   Ht 5' 8\" (1.727 m)   Wt 189 lb 6.4 oz (85.9 kg)   SpO2 97%   BMI 28.80 kg/m²     INTAKE/OUTPUT:    I/O last 3 completed shifts: In: 3609 [P.O.:1560; I.V.:2049]  Out: -   No intake/output data recorded. CONSTITUTIONAL:  awake and alert  LUNGS:  Respirations easy and unlabored, no crackles or wheezing  CARD:  regular rate and rhythm  ABDOMEN:  normal bowel sounds, soft, non-distended, less tender RUQ     Data:  CBC:   Recent Labs     12/31/20  0741 01/01/21  1105 01/02/21  1009   WBC 4.7 4.8 4.7   HGB 12.9* 12.7* 11.6*   HCT 39.0* 38.4* 34.9*    292 282     BMP:    Recent Labs     12/31/20  0741 01/01/21  1106 01/02/21  1009   * 138 136   K 3.5 3.6 3.6   CL 98* 99 100   CO2 29 29 25   BUN 11 9 9   CREATININE 1.0 0.9 0.8*   GLUCOSE 164* 190* 281*     Hepatic:   Recent Labs     12/31/20  0741 01/01/21  1106 01/02/21  1009   * 480* 381*   * 697* 596*   BILITOT 1.8* 1.7* 1.3*   ALKPHOS 326* 333* 294*     Mag:      Recent Labs     01/01/21  1106 01/02/21  1009   MG 2.00 1.80      Phos:     Recent Labs     12/31/20  0741 01/01/21  1106 01/02/21  1009   PHOS 4.3 3.3 3.8      INR: No results for input(s): INR in the last 72 hours. Radiology Review:  *Imaging personally reviewed by me. na      ASSESSMENT AND PLAN:  53 yo with acute cholecystitis  1. Tolerating diet and LFTs continue to trend down  2.   Ok to discharge home today on oral abx; plan lap hans in several weeks     Electronically signed by Letty Barajas MD     [id number]

## 2021-01-02 NOTE — PROGRESS NOTES
Hospitalist Progress Note      PCP: No primary care provider on file. Date of Admission: 12/27/2020    Chief Complaint: Abdominal Pain    Subjective: no new c/o. Medications:  Reviewed    Infusion Medications    sodium chloride 100 mL/hr at 01/02/21 0631    dextrose       Scheduled Medications    influenza virus vaccine  0.5 mL Intramuscular Prior to discharge    piperacillin-tazobactam  3.375 g Intravenous Q8H    FLUoxetine  20 mg Oral BID    levothyroxine  25 mcg Oral QAM AC    losartan  75 mg Oral Daily    sodium chloride flush  10 mL Intravenous 2 times per day    enoxaparin  40 mg Subcutaneous Daily    insulin lispro  0-12 Units Subcutaneous TID WC    insulin lispro  0-6 Units Subcutaneous Nightly     PRN Meds: senna, oxyCODONE **OR** oxyCODONE, HYDROmorphone **OR** HYDROmorphone, sodium chloride flush, acetaminophen, promethazine **OR** ondansetron, glucose, dextrose, glucagon (rDNA), dextrose      Intake/Output Summary (Last 24 hours) at 1/2/2021 0920  Last data filed at 1/2/2021 0631  Gross per 24 hour   Intake 3609 ml   Output --   Net 3609 ml       Physical Exam Performed:    /75   Pulse 65   Temp 98 °F (36.7 °C) (Oral)   Resp 14   Ht 5' 8\" (1.727 m)   Wt 189 lb 6.4 oz (85.9 kg)   SpO2 95%   BMI 28.80 kg/m²     General appearance: No apparent distress, appears stated age and cooperative. HEENT: Pupils equal, round, and reactive to light. Conjunctivae/corneas clear. Neck: Supple, with full range of motion. No jugular venous distention. Trachea midline. Respiratory:  Normal respiratory effort. Clear to auscultation, bilaterally without Rales/Wheezes/Rhonchi. Cardiovascular: Regular rate and rhythm with normal S1/S2 without murmurs, rubs or gallops. Abdomen: Soft, non-tender, non-distended with normal bowel sounds. Musculoskeletal: No clubbing, cyanosis or edema bilaterally. Full range of motion without deformity.   Skin: Skin color, texture, turgor normal.  No rashes or lesions. Neurologic:  Neurovascularly intact without any focal sensory/motor deficits. Cranial nerves: II-XII intact, grossly non-focal.  Psychiatric: Alert and oriented, thought content appropriate, normal insight  Capillary Refill: Brisk,< 3 seconds   Peripheral Pulses: +2 palpable, equal bilaterally       Labs:   Recent Labs     12/31/20  0741 01/01/21  1105   WBC 4.7 4.8   HGB 12.9* 12.7*   HCT 39.0* 38.4*    292     Recent Labs     12/31/20  0741 01/01/21  1106   * 138   K 3.5 3.6   CL 98* 99   CO2 29 29   BUN 11 9   CREATININE 1.0 0.9   CALCIUM 8.3 8.7   PHOS 4.3 3.3     Recent Labs     12/31/20  0741 01/01/21  1106   * 480*   * 697*   BILIDIR 1.1* 0.9*   BILITOT 1.8* 1.7*   ALKPHOS 326* 333*     No results for input(s): INR in the last 72 hours. No results for input(s): Arden Ripper in the last 72 hours. Urinalysis:      Lab Results   Component Value Date    NITRU Negative 12/27/2020    BLOODU Negative 12/27/2020    SPECGRAV 1.015 12/27/2020    GLUCOSEU >=1000 12/27/2020       Consults:    IP CONSULT TO GENERAL SURGERY  IP CONSULT TO HOSPITALIST  IP CONSULT TO GENERAL SURGERY  IP CONSULT TO GI      Assessment/Plan:    Active Hospital Problems    Diagnosis    Transaminitis [R74.01]    Abdominal pain, epigastric [R10.13]    Calculus of gallbladder with acute cholecystitis without obstruction [K80.00]       Acute acalculus cholecystitis - of unclear etiology. CT abdomen pelvis revealed  Acute Cholecystitis w/ Cholelithiasis but no dilated duct c/w choledocholithiasis. RUQ U/S  revealed cholecystitis w/ cholelithiasis. General Surgery consulted from ED and appreciated. GI consulted and appreciated. Started on empiric Zosyn in ED 27 Dec - continued, w/out evidence of Leukocytosis/Fever. Diet advanced but poorly tolerated w/ recurrent pain. Initially NPO for possible ERCP but MRCP 30 Dec w/out evidence of choledocholithiasis.  Diet now advanced to low fat and tolerating.      Transaminitis - Initially thought 2nd to above. Will continue to follow serial labs - persistent but stable. Reviewed and documented as above. MRCP w/out evidene of obstruction.      HTN - w/out known CAD and no evidence of active signs/sxs of ischemia/failure. Currently controlled on home meds w/ vitals reviewed and documented as above. HyperLipidemia - controlled on home Statin. Held w/ elevated LFTs. Continue f/u and med adjustment w/ PCP    DM2 - controlled on home oral antiGlycemics - held. Follow FSBS/SSI medium regimen. Last HbA1c N/A. Anticipate resuming/continuing home regimen at discharge.         DVT Prophylaxis: LMWH/IPC  Diet: Dietary Nutrition Supplements: Diabetic Oral Supplement  DIET LOW FAT; Carb Control: 3 carb choices (45 gms)/meal  Code Status: Full Code      PT/OT Eval Status: not yet ordered. Dispo - possibly Sat/Sunday 2/3 [place] at the earliest pending clinical course and subspecialty recs.  Clinically may be improving w/ lag in LFT's    Paul Mullins MD

## 2021-01-03 NOTE — DISCHARGE SUMMARY
Hospital Medicine Discharge Summary    Patient ID: Noel Weston      Patient's PCP: No primary care provider on file. Admit Date: 12/27/2020     Discharge Date: 1/2/2021      Admitting Physician: Kenzie Pendleton MD     Discharge Physician: Jace Dobson MD     Discharge Diagnoses: Active Hospital Problems    Diagnosis    Transaminitis [R74.01]    Abdominal pain, epigastric [R10.13]    Calculus of gallbladder with acute cholecystitis without obstruction [K80.00]       The patient was seen and examined on day of discharge and this discharge summary is in conjunction with any daily progress note from day of discharge. Hospital Course:       Acute acalculus cholecystitis - of unclear etiology. CT abdomen pelvis revealed  Acute Cholecystitis w/ Cholelithiasis but no dilated duct c/w choledocholithiasis.  RUQ U/S  revealed cholecystitis w/ cholelithiasis. General Surgery consulted from ED and appreciated. GI consulted and appreciated. Started on empiric Zosyn in ED 27 Dec - continued, w/out evidence of Leukocytosis/Fever. Diet advanced but poorly tolerated w/ recurrent pain. Initially NPO for possible ERCP but MRCP 30 Dec w/out evidence of choledocholithiasis. Diet now advanced to low fat and tolerating.      Transaminitis - Initially thought 2nd to above. Followed serial labs - persistent but stable and finally improving. MRCP w/out evidene of obstruction.      HTN - w/out known CAD and no evidence of active signs/sxs of ischemia/failure. Currently controlled on home meds.     HyperLipidemia - controlled on home Statin. Held w/ elevated LFTs. Continue f/u and med adjustment w/ PCP     DM2 - controlled on home oral antiGlycemics - held. Follow FSBS/SSI medium regimen. Last HbA1c N/A. Resumed home regimen at discharge.          Labs:  For convenience and continuity at follow-up the following most recent labs are provided:      CBC:    Lab Results   Component Value Date    WBC 4.7 01/02/2021 HGB 11.6 01/02/2021    HCT 34.9 01/02/2021     01/02/2021       Renal:    Lab Results   Component Value Date     01/02/2021    K 3.6 01/02/2021    K 4.1 12/28/2020     01/02/2021    CO2 25 01/02/2021    BUN 9 01/02/2021    CREATININE 0.8 01/02/2021    CALCIUM 8.5 01/02/2021    PHOS 3.8 01/02/2021         Significant Diagnostic Studies    Radiology:   MRI ABDOMEN W WO CONTRAST MRCP   Final Result   1. Spectrum of findings correlating to recent ultrasound suggesting acute   cholecystitis. 2. No evidence of choledocholithiasis. US GALLBLADDER RUQ   Final Result   Calculus cholecystitis         CT ABDOMEN PELVIS W IV CONTRAST Additional Contrast? None   Final Result   1. Cholelithiasis with cholecystitis   2. Appearance of the urinary bladder may be due to cystitis and/or chronic   bladder outlet obstruction. Correlate with urinalysis         XR ABDOMEN (KUB) (SINGLE AP VIEW)   Final Result   Moderate retained colonic stool as outlined above. No small bowel distension. Consults:     IP CONSULT TO GENERAL SURGERY  IP CONSULT TO HOSPITALIST  IP CONSULT TO GENERAL SURGERY  IP CONSULT TO GI    Disposition: home     Condition at Discharge: Stable    Discharge Instructions/Follow-up:  w/ PCP 1-2 weeks and subspecialists as arranged. Code Status:  Full Code    Activity: activity as tolerated    Diet: low fat, low cholesterol diet      Discharge Medications:     Discharge Medication List as of 1/2/2021  7:51 PM           Details   traMADol (ULTRAM) 50 MG tablet Take 1 tablet by mouth every 6 hours as needed for Pain for up to 7 days. Intended supply: 7 days.  Take lowest dose possible to manage pain, Disp-28 tablet, R-0Print      amoxicillin-clavulanate (AUGMENTIN) 875-125 MG per tablet Take 1 tablet by mouth 2 times daily for 7 days, Disp-14 tablet, R-0Print              Details   metFORMIN (GLUCOPHAGE) 500 MG tablet Take 1,000 mg by mouth 2 times daily (with meals)Historical Med      Levothyroxine Sodium (SYNTHROID PO) Take 25 mcg by mouth every morning (before breakfast)Historical Med      losartan (COZAAR) 25 MG tablet Take 75 mg by mouth dailyHistorical Med      FLUoxetine (PROZAC) 20 MG capsule Take 20 mg by mouth 2 times dailyHistorical Med             Time Spent on discharge is more than 30 minutes in the examination, evaluation, counseling and review of medications and discharge plan. Signed:    Violeta Conte MD   1/3/2021      Called patient after text page from answering service [phone] at approx 1900 4 Anibal - voicemail left. Reattempted and got voice mail again AM 5 Jan at 0930. Finally touched base on 3rd attempt at 1700 - Prescription written for pickup.

## 2021-01-03 NOTE — PROGRESS NOTES
Patient discharged with documented belongings. IV removed without complication. Refused influenza vaccine. Patient verbalizes importance of follow-up with general surgery. Rx given for antibiotic and pain medication. Denies further needs. Patient awaiting transport from cab.

## 2021-01-05 RX ORDER — OXYCODONE HYDROCHLORIDE AND ACETAMINOPHEN 5; 325 MG/1; MG/1
1 TABLET ORAL EVERY 6 HOURS PRN
Qty: 28 TABLET | Refills: 0 | Status: SHIPPED | OUTPATIENT
Start: 2021-01-05 | End: 2021-01-12

## 2021-01-18 ENCOUNTER — HOSPITAL ENCOUNTER (EMERGENCY)
Age: 51
Discharge: HOME | End: 2021-01-18
Payer: OTHER GOVERNMENT

## 2021-01-18 ENCOUNTER — TELEPHONE (OUTPATIENT)
Dept: SURGERY | Age: 51
End: 2021-01-18

## 2021-01-18 VITALS
RESPIRATION RATE: 16 BRPM | WEIGHT: 185 LBS | HEART RATE: 64 BPM | TEMPERATURE: 98 F | DIASTOLIC BLOOD PRESSURE: 88 MMHG | SYSTOLIC BLOOD PRESSURE: 125 MMHG | OXYGEN SATURATION: 98 % | BODY MASS INDEX: 28.13 KG/M2

## 2021-01-18 DIAGNOSIS — R10.11 ABDOMINAL PAIN, RIGHT UPPER QUADRANT: ICD-10-CM

## 2021-01-18 DIAGNOSIS — R74.01 TRANSAMINITIS: ICD-10-CM

## 2021-01-18 DIAGNOSIS — K80.12 CALCULUS OF GALLBLADDER WITH ACUTE ON CHRONIC CHOLECYSTITIS WITHOUT OBSTRUCTION: Primary | ICD-10-CM

## 2021-01-18 LAB
A/G RATIO: 1.4 (ref 1.1–2.2)
ALBUMIN SERPL-MCNC: 4.7 G/DL (ref 3.4–5)
ALP BLD-CCNC: 261 U/L (ref 40–129)
ALT SERPL-CCNC: 558 U/L (ref 10–40)
ANION GAP SERPL CALCULATED.3IONS-SCNC: 9 MMOL/L (ref 3–16)
AST SERPL-CCNC: 320 U/L (ref 15–37)
BASOPHILS ABSOLUTE: 0.2 K/UL (ref 0–0.2)
BASOPHILS RELATIVE PERCENT: 2.7 %
BILIRUB SERPL-MCNC: 0.9 MG/DL (ref 0–1)
BUN BLDV-MCNC: 15 MG/DL (ref 7–20)
CALCIUM SERPL-MCNC: 9.7 MG/DL (ref 8.3–10.6)
CHLORIDE BLD-SCNC: 99 MMOL/L (ref 99–110)
CO2: 28 MMOL/L (ref 21–32)
CREAT SERPL-MCNC: 1.1 MG/DL (ref 0.9–1.3)
EOSINOPHILS ABSOLUTE: 0.8 K/UL (ref 0–0.6)
EOSINOPHILS RELATIVE PERCENT: 11 %
GFR AFRICAN AMERICAN: >60
GFR NON-AFRICAN AMERICAN: >60
GLOBULIN: 3.4 G/DL
GLUCOSE BLD-MCNC: 283 MG/DL (ref 70–99)
HCT VFR BLD CALC: 41.4 % (ref 40.5–52.5)
HEMOGLOBIN: 13.7 G/DL (ref 13.5–17.5)
LIPASE: 67 U/L (ref 13–60)
LYMPHOCYTES ABSOLUTE: 2.2 K/UL (ref 1–5.1)
LYMPHOCYTES RELATIVE PERCENT: 31.3 %
MCH RBC QN AUTO: 29.7 PG (ref 26–34)
MCHC RBC AUTO-ENTMCNC: 33.1 G/DL (ref 31–36)
MCV RBC AUTO: 89.7 FL (ref 80–100)
MONOCYTES ABSOLUTE: 0.5 K/UL (ref 0–1.3)
MONOCYTES RELATIVE PERCENT: 7.5 %
NEUTROPHILS ABSOLUTE: 3.3 K/UL (ref 1.7–7.7)
NEUTROPHILS RELATIVE PERCENT: 47.5 %
PDW BLD-RTO: 14.7 % (ref 12.4–15.4)
PLATELET # BLD: 341 K/UL (ref 135–450)
PMV BLD AUTO: 7.2 FL (ref 5–10.5)
POTASSIUM SERPL-SCNC: 4.3 MMOL/L (ref 3.5–5.1)
RBC # BLD: 4.61 M/UL (ref 4.2–5.9)
SODIUM BLD-SCNC: 136 MMOL/L (ref 136–145)
TOTAL PROTEIN: 8.1 G/DL (ref 6.4–8.2)
WBC # BLD: 6.9 K/UL (ref 4–11)

## 2021-01-18 PROCEDURE — 85025 COMPLETE CBC W/AUTO DIFF WBC: CPT

## 2021-01-18 PROCEDURE — 83690 ASSAY OF LIPASE: CPT

## 2021-01-18 PROCEDURE — 80053 COMPREHEN METABOLIC PANEL: CPT

## 2021-01-18 PROCEDURE — 6370000000 HC RX 637 (ALT 250 FOR IP): Performed by: PHYSICIAN ASSISTANT

## 2021-01-18 PROCEDURE — 99284 EMERGENCY DEPT VISIT MOD MDM: CPT

## 2021-01-18 RX ORDER — DEXTROAMPHETAMINE SACCHARATE, AMPHETAMINE ASPARTATE MONOHYDRATE, DEXTROAMPHETAMINE SULFATE AND AMPHETAMINE SULFATE 7.5; 7.5; 7.5; 7.5 MG/1; MG/1; MG/1; MG/1
30 CAPSULE, EXTENDED RELEASE ORAL DAILY PRN
COMMUNITY
Start: 2021-01-15

## 2021-01-18 RX ORDER — PERMETHRIN 50 MG/G
CREAM TOPICAL
COMMUNITY
Start: 2020-11-13

## 2021-01-18 RX ORDER — FLUOXETINE 10 MG/1
10 CAPSULE ORAL 2 TIMES DAILY
COMMUNITY
Start: 2020-01-28

## 2021-01-18 RX ORDER — PANTOPRAZOLE SODIUM 40 MG/1
40 TABLET, DELAYED RELEASE ORAL DAILY PRN
COMMUNITY
Start: 2020-12-24

## 2021-01-18 RX ORDER — ONDANSETRON 4 MG/1
4 TABLET, ORALLY DISINTEGRATING ORAL EVERY 8 HOURS PRN
Qty: 10 TABLET | Refills: 0 | Status: SHIPPED | OUTPATIENT
Start: 2021-01-18

## 2021-01-18 RX ORDER — AMOXICILLIN AND CLAVULANATE POTASSIUM 875; 125 MG/1; MG/1
1 TABLET, FILM COATED ORAL 2 TIMES DAILY
Qty: 14 TABLET | Refills: 0 | Status: SHIPPED | OUTPATIENT
Start: 2021-01-18 | End: 2021-01-25

## 2021-01-18 RX ORDER — ATORVASTATIN CALCIUM 80 MG/1
40 TABLET, FILM COATED ORAL NIGHTLY
COMMUNITY
Start: 2020-01-28

## 2021-01-18 RX ORDER — AMOXICILLIN AND CLAVULANATE POTASSIUM 875; 125 MG/1; MG/1
1 TABLET, FILM COATED ORAL ONCE
Status: COMPLETED | OUTPATIENT
Start: 2021-01-18 | End: 2021-01-18

## 2021-01-18 RX ORDER — INSULIN GLARGINE 100 [IU]/ML
INJECTION, SOLUTION SUBCUTANEOUS
COMMUNITY
Start: 2021-01-14

## 2021-01-18 RX ORDER — MELOXICAM 7.5 MG/1
TABLET ORAL
COMMUNITY
Start: 2020-06-19 | End: 2021-01-22

## 2021-01-18 RX ORDER — OXYCODONE HYDROCHLORIDE 5 MG/1
5 TABLET ORAL EVERY 6 HOURS PRN
Qty: 12 TABLET | Refills: 0 | Status: SHIPPED | OUTPATIENT
Start: 2021-01-18 | End: 2021-01-21

## 2021-01-18 RX ADMIN — AMOXICILLIN AND CLAVULANATE POTASSIUM 1 TABLET: 875; 125 TABLET, FILM COATED ORAL at 19:58

## 2021-01-18 ASSESSMENT — PAIN DESCRIPTION - PAIN TYPE: TYPE: ACUTE PAIN

## 2021-01-18 ASSESSMENT — ENCOUNTER SYMPTOMS
VOMITING: 0
ABDOMINAL PAIN: 1
COUGH: 0
SHORTNESS OF BREATH: 0
BACK PAIN: 0
DIARRHEA: 0
COLOR CHANGE: 0
NAUSEA: 0
EYES NEGATIVE: 1

## 2021-01-18 ASSESSMENT — PAIN DESCRIPTION - LOCATION: LOCATION: ABDOMEN

## 2021-01-18 ASSESSMENT — PAIN SCALES - GENERAL: PAINLEVEL_OUTOF10: 7

## 2021-01-18 NOTE — ED TRIAGE NOTES
Pt states he needs gallbadder surgery, has appt on Thursday at 11, pt states he wants to be admitted and that he would like them to remove his gallbladder. He states he lives in [facility] and today is more convenient for him to have it done.

## 2021-01-18 NOTE — TELEPHONE ENCOUNTER
Patient No Showed his appt on 1/14/21, refused to come in on 1/18/21, and scheduled to follow up on 1/21/21. Dr. Aundrea Rangel notified.

## 2021-01-18 NOTE — TELEPHONE ENCOUNTER
Pt stated that he is going to go to the ER due his gallbladder and he wanted to let Dr. Skip Mancia know. He is out of his antibiotics.

## 2021-01-19 NOTE — ED PROVIDER NOTES
Negative. Musculoskeletal: Negative for back pain and neck pain. Skin: Negative for color change. Neurological: Negative for dizziness and headaches. All other systems reviewed and are negative. Except as noted above in the ROS, all other systems were reviewed and negative. PAST MEDICAL HISTORY:     Past Medical History:   Diagnosis Date    Diabetes mellitus ([facility] [address] [facility]     Hypercholesteremia     Hypertension     Thyroid cancer ([facility] [address] [facility]          SURGICAL HISTORY:      Past Surgical History:   Procedure Laterality Date    APPENDECTOMY      TOTAL THYROIDECTOMY  2017         CURRENT MEDICATIONS:       Discharge Medication List as of 1/18/2021  7:51 PM      CONTINUE these medications which have NOT CHANGED    Details   atorvastatin (LIPITOR) 80 MG tablet TAKE ONE-HALF TABLET BY MOUTH EVERY DAY FOR CHOLESTEROLHistorical Med      !! FLUoxetine (PROZAC) 10 MG capsule TAKE TWO TABLETS BY MOUTH EVERY DAYHistorical Med      meloxicam (MOBIC) 7.5 MG tablet TAKE ONE TABLET BY MOUTH ONCE DAILY AFTER A MEAL FOR LOW BACK PAIN. TAKE WITH FOODHistorical Med      insulin glargine (LANTUS) 100 UNIT/ML injection vial INJECT 20 UNITS UNDER THE SKIN DAILY AT BEDTIME FOR DIABETES. DISCARD 28 DAYS AFTER OPENING. Historical Med      glucose-vitamin C 4-6 GM-MG CHEW chewable tablet CHEW AND SWALLOW FOUR TABLETS BY MOUTH  AS NEEDED FOR LOW BLOOD SUGAR. Historical Med      diclofenac sodium (VOLTAREN) 1 % GEL APPLY 2 GRAMS TO UPPER EXTREMITIES TO AFFECTED AREA FOUR TIMES A DAY FOR LOW BACK PAIN.   DO NOT APPLY MORE THAN 32 GRAMS PER 24 HOURS., Historical Med      amphetamine-dextroamphetamine (ADDERALL XR) 30 MG extended release capsule TAKE ONE CAPSULE BY MOUTH EVERY MORNING FOR FOCUS. (FOR 1-26 THRU 2-22)Historical Med      pantoprazole (PROTONIX) 40 MG tablet Historical Med      permethrin (ELIMITE) 5 % cream Historical Med      metFORMIN (GLUCOPHAGE) 500 MG tablet Take 1,000 mg by mouth 2 times daily (with meals)Historical Med      Levothyroxine Sodium (SYNTHROID PO) Take 25 mcg by mouth every morning (before breakfast)Historical Med      losartan (COZAAR) 25 MG tablet Take 75 mg by mouth dailyHistorical Med      !! FLUoxetine (PROZAC) 20 MG capsule Take 20 mg by mouth 2 times dailyHistorical Med       !! - Potential duplicate medications found. Please discuss with provider. ALLERGIES:    Patient has no known allergies. FAMILY HISTORY:     History reviewed. No pertinent family history.        SOCIAL HISTORY:       Social History     Socioeconomic History    Marital status: Single     Spouse name: None    Number of children: None    Years of education: None    Highest education level: None   Occupational History    None   Social Needs    Financial resource strain: None    Food insecurity     Worry: None     Inability: None    Transportation needs     Medical: None     Non-medical: None   Tobacco Use    Smoking status: Former Smoker    Smokeless tobacco: Never Used    Tobacco comment: QUITE 15 YEARS AGO   Substance and Sexual Activity    Alcohol use: Not Currently     Comment: quit 6 years ago    Drug use: Never    Sexual activity: None   Lifestyle    Physical activity     Days per week: None     Minutes per session: None    Stress: None   Relationships    Social connections     Talks on phone: None     Gets together: None     Attends Muslim service: None     Active member of club or organization: None     Attends meetings of clubs or organizations: None     Relationship status: None    Intimate partner violence     Fear of current or ex partner: None     Emotionally abused: None     Physically abused: None     Forced sexual activity: None   Other Topics Concern    None   Social History Narrative    None       SCREENINGS:    [place] Coma Scale  Eye Opening: Spontaneous  Best Verbal Response: Oriented  Best Motor Response: Obeys commands  Eliud Coma Scale Score: 15        PHYSICAL EXAM:       ED Triage Vitals [01/18/21 1844]   BP Temp Temp src Pulse Resp SpO2 Height Weight   (!) 134/93 98 °F (36.7 °C) -- 63 16 98 % -- 185 lb (83.9 kg)       Physical Exam    CONSTITUTIONAL: Awake and alert. Cooperative. Well-developed. Well-nourished. Non-toxic. No acute distress. HENT: Normocephalic. Atraumatic. External ears normal, without discharge. No nasal discharge. Oropharynx clear. Mucous membranes moist.  EYES: Conjunctiva non-injected. No scleral icterus. PERRL. EOM's grossly intact. NECK: Supple. Normal ROM. CARDIOVASCULAR: RRR. No Murmer. Intact distal pulses. PULMONARY/CHEST WALL: Effort normal. No tachypnea. Lungs clear to ausculation. ABDOMEN: Normal BS. Soft. Nondistended. Mild RUQ tenderness to palpate. No guarding. /ANORECTAL: Not assessed  MUSKULOSKELETAL: Normal ROM. No acute deformities. No edema. No tenderness to palpate. SKIN: Warm and dry. No rash. NEUROLOGICAL: Alert and oriented x 3. GCS 15. CN II-XII grossly intact. Strength is 5/5 in all extremities and sensation is intact. Normal gait.    PSYCHIATRIC: Normal affect        DIAGNOSTICRESULTS:     LABS:    Results for orders placed or performed during the hospital encounter of 01/18/21   CBC Auto Differential   Result Value Ref Range    WBC 6.9 4.0 - 11.0 K/uL    RBC 4.61 4.20 - 5.90 M/uL    Hemoglobin 13.7 13.5 - 17.5 g/dL    Hematocrit 41.4 40.5 - 52.5 %    MCV 89.7 80.0 - 100.0 fL    MCH 29.7 26.0 - 34.0 pg    MCHC 33.1 31.0 - 36.0 g/dL    RDW 14.7 12.4 - 15.4 %    Platelets 079 737 - 499 K/uL    MPV 7.2 5.0 - 10.5 fL    Neutrophils % 47.5 %    Lymphocytes % 31.3 %    Monocytes % 7.5 %    Eosinophils % 11.0 %    Basophils % 2.7 %    Neutrophils Absolute 3.3 1.7 - 7.7 K/uL    Lymphocytes Absolute 2.2 1.0 - 5.1 K/uL    Monocytes Absolute 0.5 0.0 - 1.3 K/uL    Eosinophils Absolute 0.8 (H) 0.0 - 0.6 K/uL    Basophils Absolute 0.2 0.0 - 0.2 K/uL   Comprehensive Metabolic Panel   Result Value Ref Range    Sodium 136 136 - 145 mmol/L Potassium 4.3 3.5 - 5.1 mmol/L    Chloride 99 99 - 110 mmol/L    CO2 28 21 - 32 mmol/L    Anion Gap 9 3 - 16    Glucose 283 (H) 70 - 99 mg/dL    BUN 15 7 - 20 mg/dL    CREATININE 1.1 0.9 - 1.3 mg/dL    GFR Non-African American >60 >60    GFR African American >60 >60    Calcium 9.7 8.3 - 10.6 mg/dL    Total Protein 8.1 6.4 - 8.2 g/dL    Alb 4.7 3.4 - 5.0 g/dL    Albumin/Globulin Ratio 1.4 1.1 - 2.2    Total Bilirubin 0.9 0.0 - 1.0 mg/dL    Alkaline Phosphatase 261 (H) 40 - 129 U/L     (H) 10 - 40 U/L     (H) 15 - 37 U/L    Globulin 3.4 g/dL   Lipase   Result Value Ref Range    Lipase 67.0 (H) 13.0 - 60.0 U/L         RADIOLOGY:  All x-ray studies areviewed/reviewed by me. Formal interpretations per the radiologist are as follows:      Xr Abdomen (kub) (single Ap View)    Result Date: 12/27/2020  EXAMINATION: ONE SUPINE XRAY VIEW(S) OF THE ABDOMEN 12/27/2020 3:46 pm COMPARISON: None. HISTORY: ORDERING SYSTEM PROVIDED HISTORY: Abdominal pain TECHNOLOGIST PROVIDED HISTORY: Reason for exam:->Abdominal pain Reason for Exam: Abdominal pain Acuity: Acute Type of Exam: Initial FINDINGS: Moderate stool throughout the colon. The greatest quantities in the ascending, transverse and descending colon measuring as much as 8 cm transversely. No significant small bowel distension. No pathologic calcifications. Benign-appearing sclerotic lesion in the right femoral neck, possibly a bone island. Scattered vascular calcification. Moderate retained colonic stool as outlined above. No small bowel distension. Ct Abdomen Pelvis W Iv Contrast Additional Contrast? None    Result Date: 12/27/2020  EXAMINATION: CT OF THE ABDOMEN AND PELVIS WITH CONTRAST 12/27/2020 5:27 pm TECHNIQUE: CT of the abdomen and pelvis was performed with the administration of intravenous contrast. Multiplanar reformatted images are provided for review.  Dose modulation, iterative reconstruction, and/or weight based adjustment of the mA/kV was utilized to reduce the radiation dose to as low as reasonably achievable. COMPARISON: None. HISTORY: ORDERING SYSTEM PROVIDED HISTORY: Right upper quadrant abdominal pain TECHNOLOGIST PROVIDED HISTORY: Reason for exam:->Right upper quadrant abdominal pain Additional Contrast?->None Reason for Exam: RUQ pain x 3 months Acuity: Acute Type of Exam: Initial Additional signs and symptoms: nausea,constipation,last BM yesterday,first one in a week Relevant Medical/Surgical History: pt recently had a barium study at [facility] FINDINGS: Lower Chest: Lung bases clear Organs: Stones in the gallbladder. Gallbladder mucosal enhancement with pericholecystic edema. 1 cm left renal cyst.  1.7 cm splenic cyst inferior pole. Remaining solid organs unremarkable. No pancreatic duct dilatation. Common bile duct normal in caliber GI/Bowel: Diverticula of the sigmoid colon with no inflammatory change. No other gastrointestinal abnormality demonstrated. Pelvis: Mildly enlarged prostate. Mildly distended urinary bladder with bladder wall thickening and slight haziness of the perivesical fat. No free pelvic fluid Peritoneum/Retroperitoneum: No ascites or pneumoperitoneum. Aorta unremarkable Bones/Soft Tissues: No acute bony abnormality     1. Cholelithiasis with cholecystitis 2. Appearance of the urinary bladder may be due to cystitis and/or chronic bladder outlet obstruction.   Correlate with urinalysis     Us Gallbladder Ruq    Result Date: 12/27/2020  EXAMINATION: RIGHT UPPER QUADRANT ULTRASOUND 12/27/2020 6:33 pm COMPARISON: CT abdomen and pelvis earlier today HISTORY: ORDERING SYSTEM PROVIDED HISTORY: Pain right upper quadrant, history of cholelithiasis, evaluate cholecystitis TECHNOLOGIST PROVIDED HISTORY: Reason for exam:->Pain right upper quadrant, history of cholelithiasis, evaluate cholecystitis FINDINGS: LIVER:  The liver demonstrates normal echogenicity without evidence of intrahepatic biliary ductal dilatation. BILIARY SYSTEM:  Diffuse gallbladder wall thickening is present as well as cholelithiasis, with gallstones at the neck of the gallbladder. Common bile duct is within normal limits measuring 5 mm. RIGHT KIDNEY: The right kidney is grossly unremarkable without evidence of hydronephrosis. PANCREAS:  Visualized portions of the pancreas are unremarkable. OTHER: No evidence of right upper quadrant ascites. Calculus cholecystitis     Mri Abdomen W Wo Contrast Mrcp    Result Date: 12/30/2020  EXAMINATION: MRI OF THE ABDOMEN WITH AND WITHOUT CONTRAST AND MRCP 12/30/2020 2:51 pm TECHNIQUE: Multiplanar multisequence MRI of the abdomen was performed with and without the administration of intravenous contrast.  After initial T2 axial and coronal images, thick slab, thin slab and 3D coronal MRCP sequences were obtained without the administration of intravenous contrast.  MIP images are provided for review. COMPARISON: 12/27/2020 ultrasound and CT HISTORY: ORDERING SYSTEM PROVIDED HISTORY: cholelithaisis and cholecystitis. persisting abnormal LFTs. rule out choledocholithiasis TECHNOLOGIST PROVIDED HISTORY: Reason for exam:->cholelithaisis and cholecystitis. persisting abnormal LFTs. rule out choledocholithiasis FINDINGS: Gallbladder: Multiple stones are identified within the lumen of the gallbladder. Mucosal thickening and edema is present measuring up to 8 mm. There is also a small amount of pericholecystic fluid. Bile Ducts: The intrahepatic biliary ducts are normal.  The cystic duct appears normal without dilatation. Common bile duct is normal with no intraluminal filling defect appreciated. Pancreatic Duct: The pancreatic duct is normal. Other:  Incidental cysts are identified within the spleen and left kidney. No other significant finding in the abdomen. 1. Spectrum of findings correlating to recent ultrasound suggesting acute cholecystitis. 2. No evidence of choledocholithiasis.          PROCEDURES: immediately if new or worsening symptoms occur. We have discussed the symptoms which are most concerning (e.g., bloody stool, fever, changing or worsening pain, vomiting) that necessitate immediate return. FINAL IMPRESSION:      1. Calculus of gallbladder with acute on chronic cholecystitis without obstruction    2. Transaminitis    3. Abdominal pain, right upper quadrant          DISPOSITION/PLAN:   DISPOSITION Decision To Discharge      PATIENT REFERRED TO:  Sidra Chavarria MD  [address] [place] [address], [address] [place] [address]  [phone]    Go on 1/21/2021        DISCHARGE MEDICATIONS:  Discharge Medication List as of 1/18/2021  7:51 PM      START taking these medications    Details   amoxicillin-clavulanate (AUGMENTIN) 875-125 MG per tablet Take 1 tablet by mouth 2 times daily for 7 days, Disp-14 tablet, R-0Print      ondansetron (ZOFRAN ODT) 4 MG disintegrating tablet Take 1 tablet by mouth every 8 hours as needed for Nausea, Disp-10 tablet, R-0Print      oxyCODONE (ROXICODONE) 5 MG immediate release tablet Take 1 tablet by mouth every 6 hours as needed for Pain for up to 3 days. Intended supply: 3 days.  Take lowest dose possible to manage pain, Disp-12 tablet, R-0Print                        (Please note thatportions of this note were completed with a voice recognition program.  Efforts were made to edit the dictations, but occasionally words are mis-transcribed.)    Fernanda FridayJANNETTE (electronicallysigned)              Adán Newman  01/18/21 2032

## 2021-01-21 ENCOUNTER — OFFICE VISIT (OUTPATIENT)
Dept: SURGERY | Age: 51
End: 2021-01-21
Payer: OTHER GOVERNMENT

## 2021-01-21 ENCOUNTER — TELEPHONE (OUTPATIENT)
Dept: SURGERY | Age: 51
End: 2021-01-21

## 2021-01-21 ENCOUNTER — HOSPITAL ENCOUNTER (OUTPATIENT)
Age: 51
Discharge: HOME | End: 2021-01-21
Payer: OTHER GOVERNMENT

## 2021-01-21 VITALS
DIASTOLIC BLOOD PRESSURE: 84 MMHG | HEART RATE: 80 BPM | HEIGHT: 68 IN | BODY MASS INDEX: 28.04 KG/M2 | WEIGHT: 185 LBS | TEMPERATURE: 98 F | SYSTOLIC BLOOD PRESSURE: 120 MMHG

## 2021-01-21 DIAGNOSIS — K81.0 ACUTE CHOLECYSTITIS: Primary | ICD-10-CM

## 2021-01-21 PROCEDURE — U0002 COVID-19 LAB TEST NON-CDC: HCPCS

## 2021-01-21 PROCEDURE — U0003 INFECTIOUS AGENT DETECTION BY NUCLEIC ACID (DNA OR RNA); SEVERE ACUTE RESPIRATORY SYNDROME CORONAVIRUS 2 (SARS-COV-2) (CORONAVIRUS DISEASE [COVID-19]), AMPLIFIED PROBE TECHNIQUE, MAKING USE OF HIGH THROUGHPUT TECHNOLOGIES AS DESCRIBED BY CMS-2020-01-R: HCPCS

## 2021-01-21 PROCEDURE — 99212 OFFICE O/P EST SF 10 MIN: CPT | Performed by: SURGERY

## 2021-01-21 RX ORDER — OXYCODONE HYDROCHLORIDE 5 MG/1
5 TABLET ORAL EVERY 6 HOURS PRN
Qty: 16 TABLET | Refills: 0 | Status: SHIPPED | OUTPATIENT
Start: 2021-01-21 | End: 2021-01-25

## 2021-01-21 NOTE — TELEPHONE ENCOUNTER
Called patient, scheduled for lap hans on 1/26/21 at 1230 pm with an arrival of 1030 am. Informed the patient that PAT will also call with further instructions. Patient verbally states that he/she understands pre-op instructions. Patient notified of pre-op instructions:    *NPO after midnight     *H&P prior to surgery - done at the hospital on 12/27/20    *Cardiac clearance, if needed. - n/a    *Stop all blood thinners, if needed. - n/a    *Will need a     *Call the office with any further questions. *COVID testing    *Procedure/Surgery time at this point is tentative time as PAT will confirm arrival time.

## 2021-01-21 NOTE — LETTER
Surgery Scheduling Form:  DEMOGRAPHICS:                                                                                                         .  Patient Name:  Alejandro Lund  Patient :  1970   Patient SS#:      Patient Phone:  [phone] (home)                         Alt. Patient Phone:                     Patient Address:  [id number]  [address] [address]    PCP:  No primary care provider on file. Insurance:  Payor: Shahriar Raid / Plan: [facility] OPTUM / Product Type: *No Product type* /        Insurance ID Number:    Payor/Plan Subscr  Sex Relation Sub. Ins. ID Effective Group Num   1. [address] [facility] 1970 Male Self [id number]                                    [address]     Interpretor Needed:  (NO)  (TYPE)           LATEX ALLERGY:  (NO)  Allergies: No known drug allergy  Defibulator or Pacemaker:  (NO)    DIAGNOSIS & PROCEDURE:                                                                                       .  Diagnosis Code/Description:   Cholelithiasis K80.20  Operation Code/Description:  Laparoscopic cholecystectomy with intraoperative cholangiogram, possible open 19572  Location:  [facility]              Surgeon:  Dr. Ericka Arreguin:                                                                                    .  Surgeon's Scheduling Instruction:  elective  Requested Date: 21     OR Time: 1230 pm            Patient Arrival Time: 1030 am  OR Time Required:  90  Minutes  Anesthesia:  General       Equipment:  c-arm                                                           SA Required (only for Mac and Gen): yes  Status:  Outpatient        Standard C-Arm (only for port and hans):  yes   Mini C-Arm: No   PAT Required: Yes                                          Best Time to Call: Anytime  Pt.  Requested to see PCP for Pre-op H & P:  No, H&P done on 20 Cardiac Clearance Requested:  (NO)               PRE-CERTIFICATION INFORMATION:                                                                           .  Procedure/CPT code:  Laparoscopic cholecystectomy with intraoperative cholangiogram, possible open [id number]        Modifier:

## 2021-01-21 NOTE — PROGRESS NOTES
HPI: Nursing notes reviewed. Patient with continued abd pain and and nausea. On refill of abx. No fevers. ROS:  10 point review of systems performed with pertinent positives in HPI    Phys:    Abd - soft, tender RUQ, nondistended       Assesment: 53 yo with acute cholecystitis      Plan: 1. Continue current course of abx   2. Low fat diet   3.   Plan OR soon

## 2021-01-22 LAB — SARS-COV-2: NOT DETECTED

## 2021-01-22 NOTE — PROGRESS NOTES
Preoperative Screening for Elective Surgery/Invasive Procedures While COVID-19 present in the community     Have you had any of the following symptoms?none  o Fever, chills  o Cough  o Shortness of breath  o Muscle aches/pain  o Diarrhea  o Abdominal pain, nausea, vomiting  o Loss or decrease in taste and / or smell   Risk of Exposure  o Have you recently been hospitalized for COVID-19 or flu-like illness, if so when?no  o Recently diagnosed with COVID-19, if so when?no  o Recently tested for COVID-19, if so when?yes pending for preop screening  o Have you been in close contact with a person or family member who currently has or recently had COVID-19? If yes, when and in what context?no  o Do you live with anybody who in the last 14 days has had fever, chills, shortness of breath, muscle aches, flu-like illness?no  o Do you have any close contacts or family members who are currently in the hospital for COVID-19 or flu-like illness? If yes, assess recent close contact with this person. no    Indicate if the patient has a positive screen by answering yes to one or more of the above questions. Patients who test positive or screen positive prior to surgery or on the day of surgery should be evaluated in conjunction with the surgeon/proceduralist/anesthesiologist to determine the urgency of the procedure.

## 2021-01-25 ENCOUNTER — ANESTHESIA EVENT (OUTPATIENT)
Dept: OPERATING ROOM | Age: 51
End: 2021-01-25
Payer: OTHER GOVERNMENT

## 2021-01-26 ENCOUNTER — ANESTHESIA (OUTPATIENT)
Dept: OPERATING ROOM | Age: 51
End: 2021-01-26
Payer: OTHER GOVERNMENT

## 2021-01-26 ENCOUNTER — HOSPITAL ENCOUNTER (OUTPATIENT)
Dept: GENERAL RADIOLOGY | Age: 51
Discharge: HOME | End: 2021-01-26
Attending: SURGERY
Payer: OTHER GOVERNMENT

## 2021-01-26 ENCOUNTER — HOSPITAL ENCOUNTER (OUTPATIENT)
Age: 51
Setting detail: OUTPATIENT SURGERY
Discharge: HOME | End: 2021-01-26
Attending: SURGERY | Admitting: SURGERY
Payer: OTHER GOVERNMENT

## 2021-01-26 VITALS
SYSTOLIC BLOOD PRESSURE: 141 MMHG | TEMPERATURE: 98 F | RESPIRATION RATE: 18 BRPM | HEIGHT: 68 IN | BODY MASS INDEX: 28.04 KG/M2 | HEART RATE: 59 BPM | OXYGEN SATURATION: 98 % | WEIGHT: 185 LBS | DIASTOLIC BLOOD PRESSURE: 98 MMHG

## 2021-01-26 VITALS
DIASTOLIC BLOOD PRESSURE: 60 MMHG | OXYGEN SATURATION: 100 % | TEMPERATURE: 97 F | SYSTOLIC BLOOD PRESSURE: 110 MMHG | RESPIRATION RATE: 2 BRPM

## 2021-01-26 DIAGNOSIS — R10.84 ABDOMINAL PAIN, GENERALIZED: ICD-10-CM

## 2021-01-26 DIAGNOSIS — K80.20 CALCULUS OF GALLBLADDER WITHOUT CHOLECYSTITIS WITHOUT OBSTRUCTION: ICD-10-CM

## 2021-01-26 DIAGNOSIS — Z90.49 S/P LAPAROSCOPIC CHOLECYSTECTOMY: Primary | ICD-10-CM

## 2021-01-26 LAB
GLUCOSE BLD-MCNC: 187 MG/DL (ref 70–99)
GLUCOSE BLD-MCNC: 199 MG/DL (ref 70–99)
PERFORMED ON: ABNORMAL
PERFORMED ON: ABNORMAL

## 2021-01-26 PROCEDURE — 47563 LAPARO CHOLECYSTECTOMY/GRAPH: CPT | Performed by: SURGERY

## 2021-01-26 PROCEDURE — 7100000010 HC PHASE II RECOVERY - FIRST 15 MIN: Performed by: SURGERY

## 2021-01-26 PROCEDURE — 6360000002 HC RX W HCPCS: Performed by: SURGERY

## 2021-01-26 PROCEDURE — 3209999900 FLUORO FOR SURGICAL PROCEDURES

## 2021-01-26 PROCEDURE — 3600000015 HC SURGERY LEVEL 5 ADDTL 15MIN: Performed by: SURGERY

## 2021-01-26 PROCEDURE — 6360000002 HC RX W HCPCS: Performed by: NURSE ANESTHETIST, CERTIFIED REGISTERED

## 2021-01-26 PROCEDURE — 2580000003 HC RX 258: Performed by: NURSE ANESTHETIST, CERTIFIED REGISTERED

## 2021-01-26 PROCEDURE — 2709999900 HC NON-CHARGEABLE SUPPLY: Performed by: SURGERY

## 2021-01-26 PROCEDURE — 2500000003 HC RX 250 WO HCPCS: Performed by: SURGERY

## 2021-01-26 PROCEDURE — 88304 TISSUE EXAM BY PATHOLOGIST: CPT

## 2021-01-26 PROCEDURE — 7100000001 HC PACU RECOVERY - ADDTL 15 MIN: Performed by: SURGERY

## 2021-01-26 PROCEDURE — 74300 X-RAY BILE DUCTS/PANCREAS: CPT

## 2021-01-26 PROCEDURE — 3700000000 HC ANESTHESIA ATTENDED CARE: Performed by: SURGERY

## 2021-01-26 PROCEDURE — 2500000003 HC RX 250 WO HCPCS: Performed by: NURSE ANESTHETIST, CERTIFIED REGISTERED

## 2021-01-26 PROCEDURE — 3700000001 HC ADD 15 MINUTES (ANESTHESIA): Performed by: SURGERY

## 2021-01-26 PROCEDURE — 6360000002 HC RX W HCPCS: Performed by: ANESTHESIOLOGY

## 2021-01-26 PROCEDURE — 2580000003 HC RX 258: Performed by: SURGERY

## 2021-01-26 PROCEDURE — 7100000000 HC PACU RECOVERY - FIRST 15 MIN: Performed by: SURGERY

## 2021-01-26 PROCEDURE — 2580000003 HC RX 258: Performed by: ANESTHESIOLOGY

## 2021-01-26 PROCEDURE — 7100000011 HC PHASE II RECOVERY - ADDTL 15 MIN: Performed by: SURGERY

## 2021-01-26 PROCEDURE — 3600000005 HC SURGERY LEVEL 5 BASE: Performed by: SURGERY

## 2021-01-26 RX ORDER — SODIUM CHLORIDE, SODIUM LACTATE, POTASSIUM CHLORIDE, CALCIUM CHLORIDE 600; 310; 30; 20 MG/100ML; MG/100ML; MG/100ML; MG/100ML
INJECTION, SOLUTION INTRAVENOUS CONTINUOUS PRN
Status: DISCONTINUED | OUTPATIENT
Start: 2021-01-26 | End: 2021-01-26 | Stop reason: SDUPTHER

## 2021-01-26 RX ORDER — HYDRALAZINE HYDROCHLORIDE 20 MG/ML
5 INJECTION INTRAMUSCULAR; INTRAVENOUS EVERY 10 MIN PRN
Status: DISCONTINUED | OUTPATIENT
Start: 2021-01-26 | End: 2021-01-26 | Stop reason: HOSPADM

## 2021-01-26 RX ORDER — OXYCODONE HYDROCHLORIDE 5 MG/1
5-10 TABLET ORAL EVERY 6 HOURS PRN
Qty: 16 TABLET | Refills: 0 | Status: SHIPPED | OUTPATIENT
Start: 2021-01-26 | End: 2021-01-31

## 2021-01-26 RX ORDER — PROMETHAZINE HYDROCHLORIDE 25 MG/ML
6.25 INJECTION, SOLUTION INTRAMUSCULAR; INTRAVENOUS
Status: DISCONTINUED | OUTPATIENT
Start: 2021-01-26 | End: 2021-01-26 | Stop reason: HOSPADM

## 2021-01-26 RX ORDER — MORPHINE SULFATE 2 MG/ML
1 INJECTION, SOLUTION INTRAMUSCULAR; INTRAVENOUS EVERY 5 MIN PRN
Status: DISCONTINUED | OUTPATIENT
Start: 2021-01-26 | End: 2021-01-26 | Stop reason: HOSPADM

## 2021-01-26 RX ORDER — ONDANSETRON 2 MG/ML
4 INJECTION INTRAMUSCULAR; INTRAVENOUS PRN
Status: DISCONTINUED | OUTPATIENT
Start: 2021-01-26 | End: 2021-01-26 | Stop reason: HOSPADM

## 2021-01-26 RX ORDER — OXYCODONE HYDROCHLORIDE AND ACETAMINOPHEN 5; 325 MG/1; MG/1
2 TABLET ORAL PRN
Status: DISCONTINUED | OUTPATIENT
Start: 2021-01-26 | End: 2021-01-26 | Stop reason: HOSPADM

## 2021-01-26 RX ORDER — MIDAZOLAM HYDROCHLORIDE 1 MG/ML
INJECTION INTRAMUSCULAR; INTRAVENOUS PRN
Status: DISCONTINUED | OUTPATIENT
Start: 2021-01-26 | End: 2021-01-26 | Stop reason: SDUPTHER

## 2021-01-26 RX ORDER — LIDOCAINE HYDROCHLORIDE 20 MG/ML
INJECTION, SOLUTION INFILTRATION; PERINEURAL PRN
Status: DISCONTINUED | OUTPATIENT
Start: 2021-01-26 | End: 2021-01-26 | Stop reason: SDUPTHER

## 2021-01-26 RX ORDER — SODIUM CHLORIDE 0.9 % (FLUSH) 0.9 %
10 SYRINGE (ML) INJECTION EVERY 12 HOURS SCHEDULED
Status: DISCONTINUED | OUTPATIENT
Start: 2021-01-26 | End: 2021-01-26 | Stop reason: HOSPADM

## 2021-01-26 RX ORDER — PROPOFOL 10 MG/ML
INJECTION, EMULSION INTRAVENOUS PRN
Status: DISCONTINUED | OUTPATIENT
Start: 2021-01-26 | End: 2021-01-26 | Stop reason: SDUPTHER

## 2021-01-26 RX ORDER — MORPHINE SULFATE 2 MG/ML
2 INJECTION, SOLUTION INTRAMUSCULAR; INTRAVENOUS EVERY 5 MIN PRN
Status: DISCONTINUED | OUTPATIENT
Start: 2021-01-26 | End: 2021-01-26 | Stop reason: HOSPADM

## 2021-01-26 RX ORDER — OXYCODONE HYDROCHLORIDE AND ACETAMINOPHEN 5; 325 MG/1; MG/1
1 TABLET ORAL PRN
Status: DISCONTINUED | OUTPATIENT
Start: 2021-01-26 | End: 2021-01-26 | Stop reason: HOSPADM

## 2021-01-26 RX ORDER — FENTANYL CITRATE 50 UG/ML
INJECTION, SOLUTION INTRAMUSCULAR; INTRAVENOUS PRN
Status: DISCONTINUED | OUTPATIENT
Start: 2021-01-26 | End: 2021-01-26 | Stop reason: SDUPTHER

## 2021-01-26 RX ORDER — APREPITANT 40 MG/1
40 CAPSULE ORAL ONCE
Status: COMPLETED | OUTPATIENT
Start: 2021-01-26 | End: 2021-01-26

## 2021-01-26 RX ORDER — DIPHENHYDRAMINE HYDROCHLORIDE 50 MG/ML
12.5 INJECTION INTRAMUSCULAR; INTRAVENOUS
Status: DISCONTINUED | OUTPATIENT
Start: 2021-01-26 | End: 2021-01-26 | Stop reason: HOSPADM

## 2021-01-26 RX ORDER — LIDOCAINE HYDROCHLORIDE 10 MG/ML
1 INJECTION, SOLUTION EPIDURAL; INFILTRATION; INTRACAUDAL; PERINEURAL
Status: DISCONTINUED | OUTPATIENT
Start: 2021-01-26 | End: 2021-01-26 | Stop reason: HOSPADM

## 2021-01-26 RX ORDER — BUPIVACAINE HYDROCHLORIDE AND EPINEPHRINE 5; 5 MG/ML; UG/ML
INJECTION, SOLUTION PERINEURAL PRN
Status: DISCONTINUED | OUTPATIENT
Start: 2021-01-26 | End: 2021-01-26 | Stop reason: ALTCHOICE

## 2021-01-26 RX ORDER — ONDANSETRON 2 MG/ML
INJECTION INTRAMUSCULAR; INTRAVENOUS PRN
Status: DISCONTINUED | OUTPATIENT
Start: 2021-01-26 | End: 2021-01-26 | Stop reason: SDUPTHER

## 2021-01-26 RX ORDER — SODIUM CHLORIDE 0.9 % (FLUSH) 0.9 %
10 SYRINGE (ML) INJECTION PRN
Status: DISCONTINUED | OUTPATIENT
Start: 2021-01-26 | End: 2021-01-26 | Stop reason: HOSPADM

## 2021-01-26 RX ORDER — MEPERIDINE HYDROCHLORIDE 50 MG/ML
12.5 INJECTION INTRAMUSCULAR; INTRAVENOUS; SUBCUTANEOUS EVERY 5 MIN PRN
Status: DISCONTINUED | OUTPATIENT
Start: 2021-01-26 | End: 2021-01-26 | Stop reason: HOSPADM

## 2021-01-26 RX ORDER — SODIUM CHLORIDE, SODIUM LACTATE, POTASSIUM CHLORIDE, CALCIUM CHLORIDE 600; 310; 30; 20 MG/100ML; MG/100ML; MG/100ML; MG/100ML
INJECTION, SOLUTION INTRAVENOUS CONTINUOUS
Status: DISCONTINUED | OUTPATIENT
Start: 2021-01-26 | End: 2021-01-26 | Stop reason: HOSPADM

## 2021-01-26 RX ORDER — LABETALOL HYDROCHLORIDE 5 MG/ML
5 INJECTION, SOLUTION INTRAVENOUS EVERY 10 MIN PRN
Status: DISCONTINUED | OUTPATIENT
Start: 2021-01-26 | End: 2021-01-26 | Stop reason: HOSPADM

## 2021-01-26 RX ORDER — PHENYLEPHRINE HCL IN 0.9% NACL 1 MG/10 ML
SYRINGE (ML) INTRAVENOUS PRN
Status: DISCONTINUED | OUTPATIENT
Start: 2021-01-26 | End: 2021-01-26 | Stop reason: SDUPTHER

## 2021-01-26 RX ORDER — ROCURONIUM BROMIDE 10 MG/ML
INJECTION, SOLUTION INTRAVENOUS PRN
Status: DISCONTINUED | OUTPATIENT
Start: 2021-01-26 | End: 2021-01-26 | Stop reason: SDUPTHER

## 2021-01-26 RX ORDER — SODIUM CHLORIDE, SODIUM LACTATE, POTASSIUM CHLORIDE, AND CALCIUM CHLORIDE .6; .31; .03; .02 G/100ML; G/100ML; G/100ML; G/100ML
IRRIGANT IRRIGATION PRN
Status: DISCONTINUED | OUTPATIENT
Start: 2021-01-26 | End: 2021-01-26 | Stop reason: ALTCHOICE

## 2021-01-26 RX ORDER — EPHEDRINE SULFATE 50 MG/ML
INJECTION INTRAVENOUS PRN
Status: DISCONTINUED | OUTPATIENT
Start: 2021-01-26 | End: 2021-01-26 | Stop reason: SDUPTHER

## 2021-01-26 RX ADMIN — HYDROMORPHONE HYDROCHLORIDE 0.5 MG: 1 INJECTION, SOLUTION INTRAMUSCULAR; INTRAVENOUS; SUBCUTANEOUS at 14:42

## 2021-01-26 RX ADMIN — HYDROMORPHONE HYDROCHLORIDE 0.5 MG: 1 INJECTION, SOLUTION INTRAMUSCULAR; INTRAVENOUS; SUBCUTANEOUS at 15:09

## 2021-01-26 RX ADMIN — EPHEDRINE SULFATE 20 MG: 50 INJECTION INTRAVENOUS at 13:12

## 2021-01-26 RX ADMIN — EPHEDRINE SULFATE 10 MG: 50 INJECTION INTRAVENOUS at 13:05

## 2021-01-26 RX ADMIN — PROMETHAZINE HYDROCHLORIDE 6.25 MG: 25 INJECTION INTRAMUSCULAR; INTRAVENOUS at 14:49

## 2021-01-26 RX ADMIN — Medication 100 MCG: at 13:06

## 2021-01-26 RX ADMIN — LIDOCAINE HYDROCHLORIDE 60 MG: 20 INJECTION, SOLUTION INFILTRATION; PERINEURAL at 12:55

## 2021-01-26 RX ADMIN — SODIUM CHLORIDE, POTASSIUM CHLORIDE, SODIUM LACTATE AND CALCIUM CHLORIDE: 600; 310; 30; 20 INJECTION, SOLUTION INTRAVENOUS at 11:58

## 2021-01-26 RX ADMIN — Medication 100 MCG: at 13:12

## 2021-01-26 RX ADMIN — CEFAZOLIN SODIUM 2 G: 10 INJECTION, POWDER, FOR SOLUTION INTRAVENOUS at 12:50

## 2021-01-26 RX ADMIN — Medication 100 MCG: at 13:04

## 2021-01-26 RX ADMIN — SODIUM CHLORIDE, SODIUM LACTATE, POTASSIUM CHLORIDE, AND CALCIUM CHLORIDE: .6; .31; .03; .02 INJECTION, SOLUTION INTRAVENOUS at 12:50

## 2021-01-26 RX ADMIN — APREPITANT 40 MG: 40 CAPSULE ORAL at 11:58

## 2021-01-26 RX ADMIN — FENTANYL CITRATE 100 MCG: 50 INJECTION INTRAMUSCULAR; INTRAVENOUS at 12:55

## 2021-01-26 RX ADMIN — SODIUM CHLORIDE, SODIUM LACTATE, POTASSIUM CHLORIDE, AND CALCIUM CHLORIDE: .6; .31; .03; .02 INJECTION, SOLUTION INTRAVENOUS at 13:49

## 2021-01-26 RX ADMIN — FENTANYL CITRATE 50 MCG: 50 INJECTION INTRAMUSCULAR; INTRAVENOUS at 13:26

## 2021-01-26 RX ADMIN — ONDANSETRON 4 MG: 2 INJECTION INTRAMUSCULAR; INTRAVENOUS at 14:30

## 2021-01-26 RX ADMIN — ROCURONIUM BROMIDE 50 MG: 10 SOLUTION INTRAVENOUS at 12:55

## 2021-01-26 RX ADMIN — HYDROMORPHONE HYDROCHLORIDE 0.5 MG: 1 INJECTION, SOLUTION INTRAMUSCULAR; INTRAVENOUS; SUBCUTANEOUS at 14:01

## 2021-01-26 RX ADMIN — MIDAZOLAM HYDROCHLORIDE 2 MG: 2 INJECTION, SOLUTION INTRAMUSCULAR; INTRAVENOUS at 12:48

## 2021-01-26 RX ADMIN — PROPOFOL 200 MG: 10 INJECTION, EMULSION INTRAVENOUS at 12:55

## 2021-01-26 RX ADMIN — ONDANSETRON 4 MG: 2 INJECTION INTRAMUSCULAR; INTRAVENOUS at 13:00

## 2021-01-26 ASSESSMENT — PULMONARY FUNCTION TESTS
PIF_VALUE: 20
PIF_VALUE: 12
PIF_VALUE: 17
PIF_VALUE: 20
PIF_VALUE: 17
PIF_VALUE: 23
PIF_VALUE: 15
PIF_VALUE: 14
PIF_VALUE: 12
PIF_VALUE: 23
PIF_VALUE: 17
PIF_VALUE: 23
PIF_VALUE: 3
PIF_VALUE: 4
PIF_VALUE: 3
PIF_VALUE: 26
PIF_VALUE: 14
PIF_VALUE: 14
PIF_VALUE: 23
PIF_VALUE: 17
PIF_VALUE: 20
PIF_VALUE: 12
PIF_VALUE: 1
PIF_VALUE: 17
PIF_VALUE: 3
PIF_VALUE: 20
PIF_VALUE: 5
PIF_VALUE: 4
PIF_VALUE: 19
PIF_VALUE: 20
PIF_VALUE: 17

## 2021-01-26 ASSESSMENT — PAIN SCALES - GENERAL
PAINLEVEL_OUTOF10: 9
PAINLEVEL_OUTOF10: 9
PAINLEVEL_OUTOF10: 10

## 2021-01-26 ASSESSMENT — PAIN DESCRIPTION - DESCRIPTORS: DESCRIPTORS: CONSTANT;ACHING

## 2021-01-26 ASSESSMENT — PAIN - FUNCTIONAL ASSESSMENT: PAIN_FUNCTIONAL_ASSESSMENT: 0-10

## 2021-01-26 NOTE — ANESTHESIA POSTPROCEDURE EVALUATION
Department of Anesthesiology  Postprocedure Note    Patient: Valentina Elmore  MRN: [id number]  [place]: 1970  Date of evaluation: 1/26/2021  Time:  4:19 PM     Procedure Summary     Date: 01/26/21 Room / Location: [address] [place] [address]    Anesthesia Start: 1250 Anesthesia Stop: 0021    Procedure: LAPAROSCOPIC CHOLECYSTECTOMY WITH INTRAOPERATIVE CHOLANGIOGRAM, (N/A Abdomen) Diagnosis:       Calculus of gallbladder without cholecystitis without obstruction      (CHOLELITHIASIS)    Surgeons: Leslie Harrison MD Responsible Provider: Yobany Stone MD    Anesthesia Type: general ASA Status: 3          Anesthesia Type: general    Nola Phase I: Nola Score: 10    Nola Phase II:      Last vitals: Reviewed and per EMR flowsheets.        Anesthesia Post Evaluation    Comments: Postoperative Anesthesia Note    Name:    Valentina Elmore  MRN:      [id number]    Patient Vitals in the past 12 hrs:  01/26/21 1125, BP:(!) 148/98, Temp:98 °F (36.7 °C), Temp src:Temporal, Pulse:67, Resp:18, SpO2:99 %, Height:5' 8\" (1.727 m), Weight:185 lb (83.9 kg)     LABS:    CBC  Lab Results       Component                Value               Date/Time                  WBC                      6.9                 01/18/2021 06:56 PM        HGB                      13.7                01/18/2021 06:56 PM        HCT                      41.4                01/18/2021 06:56 PM        PLT                      341                 01/18/2021 06:56 PM   RENAL  Lab Results       Component                Value               Date/Time                  NA                       136                 01/18/2021 06:56 PM        K                        4.3                 01/18/2021 06:56 PM        K                        4.1                 12/28/2020 07:49 AM        CL                       99                  01/18/2021 06:56 PM        CO2                      28                  01/18/2021 06:56 PM        BUN 15                  01/18/2021 06:56 PM        CREATININE               1.1                 01/18/2021 06:56 PM        GLUCOSE                  283 (H)             01/18/2021 06:56 PM   COAGS  Lab Results       Component                Value               Date/Time                  PROTIME                  13.0                12/27/2020 04:05 PM        INR                      1.12                12/27/2020 04:05 PM     Intake & Output:  @93WUJW@    Nausea & Vomiting:  No    Level of Consciousness:  Awake    Pain Assessment:  Adequate analgesia    Anesthesia Complications:  No apparent anesthetic complications    SUMMARY      Vital signs stable  OK to discharge from Stage I post anesthesia care.   Care transferred from Anesthesiology department on discharge from perioperative area

## 2021-01-26 NOTE — OP NOTE
Date of Surgery: 1/26/21    Preop Dx:  Acute Cholecystitis    Postop Dx:  Same    Procedure:  Laparoscopic Cholecystectomy with Intraoperative Cholangiogram    Surgeon:  Fanta Walters    Assistant:  SA    Anesthesia:  GETA    EBL:   <50ml    Specimen:  gallbladder    Complications: none    Drains/Lines:  none    Indications:  51 yo with acute cholecystitis    Description:  Patient was given adequate description of the risks and rewards of the procedure, including bleeding, infection, injury to surrounding structures such as the common bile duct, need for further surgery, and possibility of open procedure and freely consented. He was given appropriate antibiotics and brought to the OR where general anesthesia was induced. He was placed in supine position. Prepped and draped in usual sterile fashion. Area above umbilicus injected with local anesthetic and #11 blade used to incise epidermis. This allowed passage of 5mm optiview trocar using zero degree laparoscope. Once this was inserted the abdomen was insufflated to 15 mmHg pressure with CO2. Patient then positioned in slight reverse Trendelenburg position. Thirty degree laparoscope inserted. Abdomen inspected and no acute inflammation seen. Area below xiphoid process and just to right of midline injected with local anesthetic and under direct visualization a 12mm trocar was inserted without issue. Then two 5mm trocars were inserted in the patient's right side using similar technique as the subxiphoid trocar. Gallbladder then grasped and retracted over dome of liver. Omental adhesions taken down. Also grasped at infundibulum and retracted anterolaterally. Cystic duct then circumferentially dissected. One endoclip placed proximally on cystic duct, which was then partially divided. Cholangiogram catheter inserted into duct and cholangiogram performed.   This demonstrated good filling of left and right hepatic ducts and flow into duodenum without signs of

## 2021-01-26 NOTE — H&P
I have reviewed the history and physical and examined the patient. I find no relevant changes. I have reviewed with the patient and/or family members, during the preoperative office visit the risks, benefits, and alternatives to the procedure. Jian Wan    From 12/27/20:      [facility] Medicine History & Physical       PCP: No primary care provider on file.     Date of Admission: 12/27/2020     Date of Service: Pt seen/examined on 12/27/2020 and Admitted to Inpatient with expected LOS greater than two midnights due to medical therapy.      Chief Complaint:         Chief Complaint   Patient presents with    Abdominal Pain       seen at urgent care in [facility] he was sent to the [facility]. Had CT scan done. They wanted to admit patient, but he didn't want to be admitted over Christmas. CT scan showed:  bilateral hydronephrosis, distended bladder, cholelithiasis, constipation, etc.     Nausea      History Of Present Illness:       52 y.o. male, with PMH of HTN, HLD, and DM 2, who presented to [facility] with right upper quadrant abdominal pain. History was obtained from the patient and review of the EMR. The patient was recently seen at urgent care a few days ago and was sent to the ED for further evaluation. He went to [facility] ED on Wednesday and had a CT scan done which showed bilateral hydronephrosis, cholelithiasis, distended bladder, and more. They wanted to admit the patient at that time, but due to the Christmas holiday, the patient did not want to be admitted and subsequently went home. He comes in today to [facility] for the same symptoms and had repeat CT scan done. This repeat scan showed cholelithiasis with cholecystitis and possible cystitis and/or chronic bladder outlet obstruction. He is noted to have significantly elevated LFTs along with hyperbilirubinemia.   General surgery was consulted in the ED, plans for possible cholecystectomy in the future once the gallbladder has calmed down. He will be admitted for general surgery and GI consultation.      Past Medical History:       Past Medical History             Diagnosis Date    Diabetes mellitus ([facility] [address]      Hypercholesteremia      Hypertension      Thyroid cancer ([facility] [address]              Past Surgical History:       Past Surgical History             Procedure Laterality Date    APPENDECTOMY        TOTAL THYROIDECTOMY   2017            Medications Prior to Admission:      Home Medications           Prior to Admission medications    Medication Sig Start Date End Date Taking? Authorizing Provider   metFORMIN (GLUCOPHAGE) 500 MG tablet Take 1,000 mg by mouth 2 times daily (with meals)     Yes Historical Provider, MD   Levothyroxine Sodium (SYNTHROID PO) Take 25 mcg by mouth every morning (before breakfast)     Yes Historical Provider, MD   losartan (COZAAR) 25 MG tablet Take 75 mg by mouth daily     Yes Historical Provider, MD   FLUoxetine (PROZAC) 20 MG capsule Take 20 mg by mouth 2 times daily     Yes Historical Provider, MD   atorvastatin (LIPITOR) 80 MG tablet Take 120 mg by mouth daily     Yes Historical Provider, MD            Allergies:  Patient has no known allergies.     Social History:       The patient currently lives independently.     TOBACCO:   reports that he has never smoked. He has never used smokeless tobacco.  ETOH:   reports previous alcohol use.        Family History:       Reviewed in detail. Positive as follows:     Family History   History reviewed. No pertinent family history.        REVIEW OF SYSTEMS:   Pertinent positives as noted in the HPI. All other systems reviewed and negative.     PHYSICAL EXAM PERFORMED:     BP (!) 154/106   Pulse 82   Temp 96.5 °F (35.8 °C) (Temporal)   Resp 18   Ht 5' 8\" (1.727 m)   Wt 185 lb (83.9 kg)   SpO2 99%   BMI 28.13 kg/m²      General appearance:  No apparent distress, appears stated age and cooperative.   HEENT:  Normal cephalic, atraumatic without obvious deformity. Pupils equal, round, and reactive to light. Extra ocular muscles intact. Conjunctivae/corneas clear. Neck: Supple, with full range of motion. No jugular venous distention. Trachea midline. Respiratory:  Normal respiratory effort. Clear to auscultation, bilaterally without Rales/Wheezes/Rhonchi. Cardiovascular:  Regular rate and rhythm with normal S1/S2 without murmurs, rubs or gallops. Abdomen: Soft, non-tender, non-distended with normal bowel sounds. Musculoskeletal:  No clubbing, cyanosis or edema bilaterally. Full range of motion without deformity. Skin: Skin color, texture, turgor normal.  No rashes or lesions. Neurologic:  Neurovascularly intact without any focal sensory/motor deficits. Cranial nerves: II-XII intact, grossly non-focal.  Psychiatric:  Alert and oriented, thought content appropriate, normal insight  Capillary Refill: Brisk,< 3 seconds   Peripheral Pulses: +2 palpable, equal bilaterally         Labs:          Recent Labs     12/27/20  1605   WBC 6.0   HGB 13.0*   HCT 39.1*             Recent Labs     12/27/20  1605   *   K 4.1   CL 99   CO2 26   BUN 13   CREATININE 1.0   CALCIUM 8.5          Recent Labs     12/27/20  1605   *   *   BILIDIR 0.9*   BILITOT 1.6*   ALKPHOS 324*          Recent Labs     12/27/20  1605   INR 1.12          Recent Labs     12/27/20  1605   TROPONINI <0.01         Urinalysis:            Lab Results   Component Value Date     NITRU Negative 12/27/2020     BLOODU Negative 12/27/2020     SPECGRAV 1.015 12/27/2020     GLUCOSEU >=1000 12/27/2020         Radiology:      CXR: I have reviewed the CXR with the following interpretation: n/a  EKG:  I have reviewed the EKG with the following interpretation: NSR     US GALLBLADDER RUQ   Final Result   Calculus cholecystitis           CT ABDOMEN PELVIS W IV CONTRAST Additional Contrast? None   Final Result   1. Cholelithiasis with cholecystitis   2.  Appearance of the urinary bladder may be due to cystitis and/or chronic   bladder outlet obstruction. Correlate with urinalysis           XR ABDOMEN (KUB) (SINGLE AP VIEW)   Final Result   Moderate retained colonic stool as outlined above. No small bowel distension.                 ASSESSMENT:          Active Hospital Problems     Diagnosis Date Noted    Acute calculous cholecystitis [K80.00] 12/27/2020            PLAN:     Acute calculus cholecystitis  - CT abdomen pelvis revealed: Cholelithiasis with cholecystitis. Possible cystitis and/or chronic bladder outlet obstruction.  - Right upper quadrant gallbladder ultrasound revealed calculus cholecystitis  - General surgery was consulted in the ED, patient will likely need antibiotics to decrease inflammation before able to have cholecystectomy  - GI consulted, thank you recommendations  - Empiric IV zosyn started in the ED, continued 12/27/2020     Abnormal LFTs 2/2 above  - Monitor with CMP  - Avoid hepatotoxins as able     Essential HTN, not well controlled. - Continue home losaran  - Telemetry monitoring     Hx of HLD, controlled with statin. - Hold home Lipitor  - Follow-up with PCP for med adjustments     DM2, uncontrolled. -  on admission  - Hemoglobin a1c pending  - MDSSI   - POCT ac/hs  - Hypoglycemia protocol        DVT Prophylaxis: lovenox  Diet: Diet NPO Effective Now  Code Status: No Order     PT/OT Eval Status: not ordered     Dispo - pending clinical improvement         Mary Arevalo - NP     Thank you No primary care provider on file. for the opportunity to be involved in this patient's care. If you have any questions or concerns please feel free to contact me at [phone].

## 2021-01-26 NOTE — ANESTHESIA PRE PROCEDURE
Department of Anesthesiology  Preprocedure Note       Name:  Romilda Ganser   Age:  52 y.o.  :  1970                                          MRN:  [id number]         Date:  2021      Surgeon: Nj Walker):  Jian Wan MD    Procedure: Procedure(s):  LAPAROSCOPIC CHOLECYSTECTOMY WITH INTRAOPERATIVE CHOLANGIOGRAM, POSSIBLE OPEN PROCEDURE    Medications prior to admission:   Prior to Admission medications    Medication Sig Start Date End Date Taking? Authorizing Provider   B Complex-C (SUPER B COMPLEX PO) Take 1 tablet by mouth daily   Yes Historical Provider, MD   atorvastatin (LIPITOR) 80 MG tablet Take 40 mg by mouth nightly  20  Yes Historical Provider, MD   FLUoxetine (PROZAC) 10 MG capsule Take 10 mg by mouth 2 times daily  20  Yes Historical Provider, MD   pantoprazole (PROTONIX) 40 MG tablet Take 40 mg by mouth daily as needed  20  Yes Historical Provider, MD   insulin glargine (LANTUS) 100 UNIT/ML injection vial INJECT 20 UNITS UNDER THE SKIN DAILY AT BEDTIME FOR DIABETES. DISCARD 28 DAYS AFTER OPENING. 21  Yes Historical Provider, MD   amphetamine-dextroamphetamine (ADDERALL XR) 30 MG extended release capsule Take 30 mg by mouth daily as needed.   1/15/21  Yes Historical Provider, MD   ondansetron (ZOFRAN ODT) 4 MG disintegrating tablet Take 1 tablet by mouth every 8 hours as needed for Nausea 21  Yes ARUNA Mcdermott   metFORMIN (GLUCOPHAGE) 500 MG tablet Take 1,000 mg by mouth 2 times daily (with meals)   Yes Historical Provider, MD   losartan (COZAAR) 25 MG tablet Take 75 mg by mouth daily afternoon   Yes Historical Provider, MD   permethrin (ELIMITE) 5 % cream  20   Historical Provider, MD   glucose-vitamin C 4-6 GM-MG CHEW chewable tablet CHEW AND SWALLOW FOUR TABLETS BY MOUTH  AS NEEDED FOR LOW BLOOD SUGAR. 20   Historical Provider, MD   diclofenac sodium (VOLTAREN) 1 % GEL APPLY 2 GRAMS TO UPPER EXTREMITIES TO AFFECTED AREA FOUR TIMES A DAY FOR LOW BACK PAIN. DO NOT APPLY MORE THAN 32 GRAMS PER 24 HOURS.  6/19/20   Historical Provider, MD   Levothyroxine Sodium (SYNTHROID PO) Take 50 mcg by mouth every morning (before breakfast)     Historical Provider, MD       Current medications:    Current Facility-Administered Medications   Medication Dose Route Frequency Provider Last Rate Last Admin    lactated ringers infusion   Intravenous Continuous Annette Tabares  mL/hr at 01/26/21 1158 New Bag at 01/26/21 1158    sodium chloride flush 0.9 % injection 10 mL  10 mL Intravenous 2 times per day Annette Tabares MD        sodium chloride flush 0.9 % injection 10 mL  10 mL Intravenous PRN Annette Tabares MD        lidocaine PF 1 % injection 1 mL  1 mL Intradermal Once PRN Annette Tabares MD        ceFAZolin (ANCEF) 2000 mg in dextrose 5 % 100 mL IVPB  2,000 mg Intravenous On Call to Latricia Gomez MD           Allergies:  No Known Allergies    Problem List:    Patient Active Problem List   Diagnosis Code    Calculus of gallbladder with acute cholecystitis without obstruction K80.00    Transaminitis R74.01    Abdominal pain, epigastric R10.13       Past Medical History:        Diagnosis Date    [facility] ([facility] [address]     Hypercholesteremia     Hypertension     VANESSA (obstructive sleep apnea)     No CPAP    [facility]        Past Surgical History:        Procedure Laterality Date    APPENDECTOMY      TOTAL THYROIDECTOMY  2017       Social History:    Social History     Tobacco Use    Smoking status: Former Smoker     Types: Cigarettes    Smokeless tobacco: Never Used    Tobacco comment: QUITE 15 YEARS AGO   Substance Use Topics    Alcohol use: Not Currently     Comment: quit 6 years ago                                Counseling given: Not Answered  Comment: QUITE 15 YEARS AGO      Vital Signs (Current):   Vitals:    01/22/21 1222 01/26/21 1125   Weight: 185 lb (83.9 kg) 185 lb (83.9 kg)   Height: 5' 8\" (1.727 m) 5' 8\" (1.727 m)                                              BP Readings from Last 3 Encounters:   01/21/21 120/84   01/18/21 125/88   01/02/21 127/68       NPO Status: Time of last liquid consumption: 2000                        Time of last solid consumption: 2000                        Date of last liquid consumption: 01/25/21                        Date of last solid food consumption: 01/25/21    BMI:   Wt Readings from Last 3 Encounters:   01/26/21 185 lb (83.9 kg)   01/21/21 185 lb (83.9 kg)   01/18/21 185 lb (83.9 kg)     Body mass index is 28.13 kg/m².     CBC:   Lab Results   Component Value Date    WBC 6.9 01/18/2021    RBC 4.61 01/18/2021    HGB 13.7 01/18/2021    HCT 41.4 01/18/2021    MCV 89.7 01/18/2021    RDW 14.7 01/18/2021     01/18/2021       CMP:   Lab Results   Component Value Date     01/18/2021    K 4.3 01/18/2021    K 4.1 12/28/2020    CL 99 01/18/2021    CO2 28 01/18/2021    BUN 15 01/18/2021    CREATININE 1.1 01/18/2021    GFRAA >60 01/18/2021    AGRATIO 1.4 01/18/2021    LABGLOM >60 01/18/2021    GLUCOSE 283 01/18/2021    PROT 8.1 01/18/2021    CALCIUM 9.7 01/18/2021    BILITOT 0.9 01/18/2021    ALKPHOS 261 01/18/2021     01/18/2021     01/18/2021       POC Tests:   Recent Labs     01/26/21  1138   POCGLU 187*       Coags:   Lab Results   Component Value Date    PROTIME 13.0 12/27/2020    INR 1.12 12/27/2020       HCG (If Applicable): No results found for: PREGTESTUR, PREGSERUM, HCG, HCGQUANT     ABGs: No results found for: PHART, PO2ART, DYI6NUS, RJB9LZK, BEART, T7RZGTCH     Type & Screen (If Applicable):  No results found for: LABABO, LABRH    Drug/Infectious Status (If Applicable):  No results found for: HIV, HEPCAB    COVID-19 Screening (If Applicable):   Lab Results   Component Value Date    COVID19 Not Detected 01/21/2021         Anesthesia Evaluation  Patient summary reviewed and Nursing notes reviewed  Airway: Mallampati: III  TM distance: >3 FB   Neck ROM: full  Mouth opening: > = 3 FB Dental: normal exam         Pulmonary:normal exam  breath sounds clear to auscultation  (+) sleep apnea: on CPAP,                             Cardiovascular:    (+) hypertension:,         Rhythm: regular  Rate: normal                    Neuro/Psych:   Negative Neuro/Psych ROS              GI/Hepatic/Renal: Neg GI/Hepatic/Renal ROS            Endo/Other:    (+) DiabetesType II DM, , .                 Abdominal:           Vascular: negative vascular ROS. Anesthesia Plan      general     ASA 3       Induction: intravenous. MIPS: Postoperative opioids intended and Prophylactic antiemetics administered. Anesthetic plan and risks discussed with patient. Plan discussed with CRNA.                   Freedom Poole MD   1/26/2021

## 2021-01-28 ENCOUNTER — APPOINTMENT (OUTPATIENT)
Dept: CT IMAGING | Age: 51
End: 2021-01-28
Payer: OTHER GOVERNMENT

## 2021-01-28 ENCOUNTER — HOSPITAL ENCOUNTER (EMERGENCY)
Age: 51
Discharge: HOME | End: 2021-01-28
Payer: OTHER GOVERNMENT

## 2021-01-28 ENCOUNTER — TELEPHONE (OUTPATIENT)
Dept: SURGERY | Age: 51
End: 2021-01-28

## 2021-01-28 VITALS
BODY MASS INDEX: 28.04 KG/M2 | HEIGHT: 68 IN | OXYGEN SATURATION: 99 % | TEMPERATURE: 98.2 F | HEART RATE: 77 BPM | SYSTOLIC BLOOD PRESSURE: 143 MMHG | RESPIRATION RATE: 16 BRPM | WEIGHT: 185 LBS | DIASTOLIC BLOOD PRESSURE: 97 MMHG

## 2021-01-28 DIAGNOSIS — Z90.49 STATUS POST CHOLECYSTECTOMY: ICD-10-CM

## 2021-01-28 DIAGNOSIS — R10.13 ABDOMINAL PAIN, EPIGASTRIC: Primary | ICD-10-CM

## 2021-01-28 DIAGNOSIS — R11.0 NAUSEA WITHOUT VOMITING: ICD-10-CM

## 2021-01-28 DIAGNOSIS — K59.00 CONSTIPATION, UNSPECIFIED CONSTIPATION TYPE: ICD-10-CM

## 2021-01-28 LAB
A/G RATIO: 1.1 (ref 1.1–2.2)
ALBUMIN SERPL-MCNC: 3.6 G/DL (ref 3.4–5)
ALP BLD-CCNC: 207 U/L (ref 40–129)
ALT SERPL-CCNC: 585 U/L (ref 10–40)
ANION GAP SERPL CALCULATED.3IONS-SCNC: 10 MMOL/L (ref 3–16)
AST SERPL-CCNC: 298 U/L (ref 15–37)
BASOPHILS ABSOLUTE: 0 K/UL (ref 0–0.2)
BASOPHILS RELATIVE PERCENT: 0.5 %
BILIRUB SERPL-MCNC: 1.4 MG/DL (ref 0–1)
BILIRUBIN URINE: ABNORMAL
BLOOD, URINE: NEGATIVE
BUN BLDV-MCNC: 15 MG/DL (ref 7–20)
CALCIUM SERPL-MCNC: 8.5 MG/DL (ref 8.3–10.6)
CHLORIDE BLD-SCNC: 96 MMOL/L (ref 99–110)
CLARITY: CLEAR
CO2: 23 MMOL/L (ref 21–32)
COLOR: ABNORMAL
CREAT SERPL-MCNC: 0.6 MG/DL (ref 0.9–1.3)
EOSINOPHILS ABSOLUTE: 0.5 K/UL (ref 0–0.6)
EOSINOPHILS RELATIVE PERCENT: 6.6 %
GFR AFRICAN AMERICAN: >60
GFR NON-AFRICAN AMERICAN: >60
GLOBULIN: 3.4 G/DL
GLUCOSE BLD-MCNC: 346 MG/DL (ref 70–99)
GLUCOSE URINE: >=1000 MG/DL
HCT VFR BLD CALC: 36.8 % (ref 40.5–52.5)
HEMOGLOBIN: 12.3 G/DL (ref 13.5–17.5)
KETONES, URINE: ABNORMAL MG/DL
LEUKOCYTE ESTERASE, URINE: NEGATIVE
LIPASE: 42 U/L (ref 13–60)
LYMPHOCYTES ABSOLUTE: 1 K/UL (ref 1–5.1)
LYMPHOCYTES RELATIVE PERCENT: 12.4 %
MCH RBC QN AUTO: 30.6 PG (ref 26–34)
MCHC RBC AUTO-ENTMCNC: 33.4 G/DL (ref 31–36)
MCV RBC AUTO: 91.5 FL (ref 80–100)
MICROSCOPIC EXAMINATION: ABNORMAL
MONOCYTES ABSOLUTE: 0.7 K/UL (ref 0–1.3)
MONOCYTES RELATIVE PERCENT: 8.9 %
NEUTROPHILS ABSOLUTE: 5.6 K/UL (ref 1.7–7.7)
NEUTROPHILS RELATIVE PERCENT: 71.6 %
NITRITE, URINE: NEGATIVE
PDW BLD-RTO: 14.8 % (ref 12.4–15.4)
PH UA: 5.5 (ref 5–8)
PLATELET # BLD: 251 K/UL (ref 135–450)
PMV BLD AUTO: 8.1 FL (ref 5–10.5)
POTASSIUM REFLEX MAGNESIUM: 4.2 MMOL/L (ref 3.5–5.1)
PROTEIN UA: NEGATIVE MG/DL
RBC # BLD: 4.02 M/UL (ref 4.2–5.9)
SODIUM BLD-SCNC: 129 MMOL/L (ref 136–145)
SPECIFIC GRAVITY UA: 1.02 (ref 1–1.03)
TOTAL PROTEIN: 7 G/DL (ref 6.4–8.2)
URINE REFLEX TO CULTURE: ABNORMAL
URINE TYPE: ABNORMAL
UROBILINOGEN, URINE: 4 E.U./DL
WBC # BLD: 7.9 K/UL (ref 4–11)

## 2021-01-28 PROCEDURE — 83690 ASSAY OF LIPASE: CPT

## 2021-01-28 PROCEDURE — 99284 EMERGENCY DEPT VISIT MOD MDM: CPT

## 2021-01-28 PROCEDURE — 96375 TX/PRO/DX INJ NEW DRUG ADDON: CPT

## 2021-01-28 PROCEDURE — 96376 TX/PRO/DX INJ SAME DRUG ADON: CPT

## 2021-01-28 PROCEDURE — 2580000003 HC RX 258: Performed by: NURSE PRACTITIONER

## 2021-01-28 PROCEDURE — 85025 COMPLETE CBC W/AUTO DIFF WBC: CPT

## 2021-01-28 PROCEDURE — 6360000002 HC RX W HCPCS: Performed by: NURSE PRACTITIONER

## 2021-01-28 PROCEDURE — 74177 CT ABD & PELVIS W/CONTRAST: CPT

## 2021-01-28 PROCEDURE — 80053 COMPREHEN METABOLIC PANEL: CPT

## 2021-01-28 PROCEDURE — 81003 URINALYSIS AUTO W/O SCOPE: CPT

## 2021-01-28 PROCEDURE — 96374 THER/PROPH/DIAG INJ IV PUSH: CPT

## 2021-01-28 PROCEDURE — 96372 THER/PROPH/DIAG INJ SC/IM: CPT

## 2021-01-28 PROCEDURE — 96361 HYDRATE IV INFUSION ADD-ON: CPT

## 2021-01-28 PROCEDURE — 6360000004 HC RX CONTRAST MEDICATION: Performed by: NURSE PRACTITIONER

## 2021-01-28 RX ORDER — ONDANSETRON 4 MG/1
4 TABLET, ORALLY DISINTEGRATING ORAL EVERY 8 HOURS PRN
Qty: 20 TABLET | Refills: 0 | Status: SHIPPED | OUTPATIENT
Start: 2021-01-28

## 2021-01-28 RX ORDER — ONDANSETRON 2 MG/ML
4 INJECTION INTRAMUSCULAR; INTRAVENOUS EVERY 30 MIN PRN
Status: DISCONTINUED | OUTPATIENT
Start: 2021-01-28 | End: 2021-01-28 | Stop reason: HOSPADM

## 2021-01-28 RX ORDER — POLYETHYLENE GLYCOL 3350 17 G/17G
17 POWDER, FOR SOLUTION ORAL DAILY
Qty: 1 BOTTLE | Refills: 0 | Status: SHIPPED | OUTPATIENT
Start: 2021-01-28 | End: 2021-02-04

## 2021-01-28 RX ORDER — MORPHINE SULFATE 4 MG/ML
4 INJECTION, SOLUTION INTRAMUSCULAR; INTRAVENOUS ONCE
Status: COMPLETED | OUTPATIENT
Start: 2021-01-28 | End: 2021-01-28

## 2021-01-28 RX ORDER — SODIUM CHLORIDE, SODIUM LACTATE, POTASSIUM CHLORIDE, CALCIUM CHLORIDE 600; 310; 30; 20 MG/100ML; MG/100ML; MG/100ML; MG/100ML
1000 INJECTION, SOLUTION INTRAVENOUS ONCE
Status: COMPLETED | OUTPATIENT
Start: 2021-01-28 | End: 2021-01-28

## 2021-01-28 RX ORDER — METOCLOPRAMIDE HYDROCHLORIDE 5 MG/ML
10 INJECTION INTRAMUSCULAR; INTRAVENOUS ONCE
Status: COMPLETED | OUTPATIENT
Start: 2021-01-28 | End: 2021-01-28

## 2021-01-28 RX ORDER — METOCLOPRAMIDE 10 MG/1
10 TABLET ORAL 4 TIMES DAILY
Qty: 20 TABLET | Refills: 0 | Status: SHIPPED | OUTPATIENT
Start: 2021-01-28

## 2021-01-28 RX ADMIN — SODIUM CHLORIDE, POTASSIUM CHLORIDE, SODIUM LACTATE AND CALCIUM CHLORIDE 1000 ML: 600; 310; 30; 20 INJECTION, SOLUTION INTRAVENOUS at 13:34

## 2021-01-28 RX ADMIN — METHYLNALTREXONE BROMIDE 12 MG: 12 INJECTION, SOLUTION SUBCUTANEOUS at 15:59

## 2021-01-28 RX ADMIN — ONDANSETRON 4 MG: 2 INJECTION INTRAMUSCULAR; INTRAVENOUS at 13:34

## 2021-01-28 RX ADMIN — IOPAMIDOL 75 ML: 755 INJECTION, SOLUTION INTRAVENOUS at 14:36

## 2021-01-28 RX ADMIN — MORPHINE SULFATE 4 MG: 4 INJECTION, SOLUTION INTRAMUSCULAR; INTRAVENOUS at 15:59

## 2021-01-28 RX ADMIN — MORPHINE SULFATE 4 MG: 4 INJECTION, SOLUTION INTRAMUSCULAR; INTRAVENOUS at 14:18

## 2021-01-28 RX ADMIN — METOCLOPRAMIDE 10 MG: 5 INJECTION, SOLUTION INTRAMUSCULAR; INTRAVENOUS at 15:59

## 2021-01-28 ASSESSMENT — ENCOUNTER SYMPTOMS
SHORTNESS OF BREATH: 0
NAUSEA: 0
VOMITING: 0
COUGH: 0
DIARRHEA: 0
BACK PAIN: 0
ABDOMINAL PAIN: 1
WHEEZING: 0
COLOR CHANGE: 0

## 2021-01-28 ASSESSMENT — PAIN SCALES - GENERAL
PAINLEVEL_OUTOF10: 8
PAINLEVEL_OUTOF10: 8
PAINLEVEL_OUTOF10: 10

## 2021-01-28 ASSESSMENT — PAIN DESCRIPTION - ORIENTATION
ORIENTATION: RIGHT;UPPER

## 2021-01-28 ASSESSMENT — PAIN DESCRIPTION - LOCATION: LOCATION: ABDOMEN

## 2021-01-28 ASSESSMENT — PAIN DESCRIPTION - DESCRIPTORS: DESCRIPTORS: ACHING;CRAMPING;SHARP

## 2021-01-28 ASSESSMENT — PAIN DESCRIPTION - PAIN TYPE: TYPE: ACUTE PAIN

## 2021-01-28 NOTE — ED PROVIDER NOTES
**ADVANCED PRACTICE PROVIDER, I HAVE EVALUATED THIS [facility]  ED  EMERGENCY DEPARTMENT ENCOUNTER      Pt Name: Jody Mendez  GHZ:[id number]  [place] 1970  Date of evaluation: 1/28/2021  Provider: COLTON Puga CNP      Chief Complaint:    Chief Complaint   Patient presents with    Abdominal Pain     Recent gallbladder surgery by Dr. Ruth Dodd on 1/26. Patient with abdominal pain starting last evening, progessively getting worse. Pain localized right upper quadrant, aching/sharp/cramping, 10/10. Nursing Notes, Past Medical Hx, Past Surgical Hx, Social Hx, Allergies, and Family Hx were all reviewed and agreed with or any disagreements were addressed in the HPI.    HPI:  (Location, Duration, Timing, Severity, Quality, Assoc Sx, Context, Modifying factors)  This is a  52 y.o. male who presents today with complains of right upper quadrant and epigastric abdominal pain. Patient states that he just had a cholecystectomy on January 26 by Dr. Ruth Dodd, he is coming in today with worsening abdominal pain associate with nausea but no vomiting or diarrhea. He states he is not had a bowel movement since Monday. He denies any cough, congestion, fever or chills, no chest pain for chest pain or shortness of breath, no urinary symptoms. He reports he has been taking his pain medicine at home as prescribed however he is having worsening epigastric and right upper quadrant abdominal pain. Rates the pain a 10 on a 10. Denies any urinary symptoms, no additional complaints, no additional aggravating relieving factors.   Patient presents awake, alert and in no acute distress or toxic appearance    PastMedical/Surgical History:      Diagnosis Date    Diabetes mellitus ([address]     Hypercholesteremia     Hypertension     VANESSA (obstructive sleep apnea)     No CPAP    Thyroid cancer ([facility] [address]          Procedure Laterality Date    APPENDECTOMY      CHOLECYSTECTOMY, LAPAROSCOPIC N/A 1/26/2021    LAPAROSCOPIC CHOLECYSTECTOMY WITH INTRAOPERATIVE CHOLANGIOGRAM, performed by Desi Kerr MD at [address] [place] [address]  2017       Medications:  Discharge Medication List as of 1/28/2021  4:55 PM      CONTINUE these medications which have NOT CHANGED    Details   oxyCODONE (ROXICODONE) 5 MG immediate release tablet Take 1-2 tablets by mouth every 6 hours as needed for Pain for up to 5 days. Intended supply: 5 days. Take lowest dose possible to manage pain, Disp-16 tablet, R-0Print      B Complex-C (SUPER B COMPLEX PO) Take 1 tablet by mouth dailyHistorical Med      atorvastatin (LIPITOR) 80 MG tablet Take 40 mg by mouth nightly Historical Med      FLUoxetine (PROZAC) 10 MG capsule Take 10 mg by mouth 2 times daily Historical Med      pantoprazole (PROTONIX) 40 MG tablet Take 40 mg by mouth daily as needed Historical Med      permethrin (ELIMITE) 5 % cream Historical Med      insulin glargine (LANTUS) 100 UNIT/ML injection vial INJECT 20 UNITS UNDER THE SKIN DAILY AT BEDTIME FOR DIABETES. DISCARD 28 DAYS AFTER OPENING. Historical Med      glucose-vitamin C 4-6 GM-MG CHEW chewable tablet CHEW AND SWALLOW FOUR TABLETS BY MOUTH  AS NEEDED FOR LOW BLOOD SUGAR. Historical Med      diclofenac sodium (VOLTAREN) 1 % GEL APPLY 2 GRAMS TO UPPER EXTREMITIES TO AFFECTED AREA FOUR TIMES A DAY FOR LOW BACK PAIN. DO NOT APPLY MORE THAN 32 GRAMS PER 24 HOURS., Historical Med      amphetamine-dextroamphetamine (ADDERALL XR) 30 MG extended release capsule Take 30 mg by mouth daily as needed.  Historical Med      !! ondansetron (ZOFRAN ODT) 4 MG disintegrating tablet Take 1 tablet by mouth every 8 hours as needed for Nausea, Disp-10 tablet, R-0Print      metFORMIN (GLUCOPHAGE) 500 MG tablet Take 1,000 mg by mouth 2 times daily (with meals)Historical Med      Levothyroxine Sodium (SYNTHROID PO) Take 50 mcg by mouth every morning (before breakfast) Historical Med      losartan (COZAAR) 25 MG tablet Take 75 mg by mouth daily afternoonHistorical Med       !! - Potential duplicate medications found. Please discuss with provider. Review of Systems:  Review of Systems   Constitutional: Negative for chills and fever. HENT: Negative for congestion. Respiratory: Negative for cough, shortness of breath and wheezing. Cardiovascular: Negative for chest pain. Gastrointestinal: Positive for abdominal pain. Negative for diarrhea, nausea and vomiting. Patient complains of right upper quadrant and epigastric abdominal pain. Patient states that he just had a cholecystectomy on January 26 by Dr. Zahida Romero, he is coming in today with worsening abdominal pain associate with nausea but no vomiting or diarrhea. Genitourinary: Negative for difficulty urinating, dysuria and frequency. Musculoskeletal: Negative for back pain. Skin: Negative for color change. Neurological: Negative for weakness, numbness and headaches. Positives and Pertinent negatives as per HPI. Except as noted above in the ROS, problem specific ROS was completed and is negative. Physical Exam:  Physical Exam  Vitals signs and nursing note reviewed. Constitutional:       Appearance: He is well-developed. He is not diaphoretic. HENT:      Head: Normocephalic. Right Ear: External ear normal.      Left Ear: External ear normal.   Eyes:      General: No scleral icterus. Right eye: No discharge. Left eye: No discharge. Neck:      Musculoskeletal: Normal range of motion and neck supple. Cardiovascular:      Rate and Rhythm: Normal rate and regular rhythm. Pulmonary:      Effort: Pulmonary effort is normal. No respiratory distress. Breath sounds: Normal breath sounds. Abdominal:      Palpations: Abdomen is soft. Tenderness: There is abdominal tenderness. Comments: Abdomen soft nondistended.   Bowel sounds are hypoactive, he does have tenderness in the epigastric and right upper quadrant of Color, UA DARK YELLOW (*)     Glucose, Ur >=1000 (*)     Bilirubin Urine SMALL (*)     Ketones, Urine TRACE (*)     Urobilinogen, Urine 4.0 (*)     All other components within normal limits    Narrative:     Performed at:  [facility]  [address],  [place], [address]   Phone [phone]   LIPASE    Narrative:     Performed at:  [facility]) [facility]  [address],  [place], [address]   Phone  of labs reviewed and werenegative at this time or not returned at the time of this note. RADIOLOGY:   Non-plain film images such as CT, Ultrasound and MRI are read by the radiologist. Rosalva DODD APRN - CNP have directly visualized the radiologic plain film image(s) with the below findings:        Interpretation per the Radiologist below, if available at the time of this note:    CT ABDOMEN PELVIS W IV CONTRAST Additional Contrast? None   Final Result   Recent post laparoscopic findings are noted in the upper abdomen. The amount   of emphysema in the chest/abdominal wall and intraperitoneal air is in the   expected range. Mild edema in the gallbladder fossa and paraduodenal fat, consistent with   recent surgery. There is mild prominence of intrahepatic biliary ducts. However no dilation of the common duct is appreciated. Correlation with   liver function tests would be helpful. Chronic hypertrophy of the urinary bladder wall and mild-to-moderate   hypertrophy of the prostate gland. Bladder outlet stenosis is a   consideration. Bibasilar atelectasis. Dense coronary artery calcification. Optimized medical management recommended. RECOMMENDATIONS:   If there is persisting concern for bile leak, follow-up hepatic biliary scan   would be considered.               MEDICAL DECISION MAKING / ED COURSE:    Because of high probability of sudden clinical deterioration of the patient's condition and risk of further deterioration, critical care time required my full attention to the patient's condition; which included chart data review, documentation, medication ordering, reviewing the patient's old records, reevaluation patient's cardiac, pulmonary and neurological status. Reevaluation of vital signs. Consultations with ED attending and admitting physician. Ordering, interpreting reviewing diagnostic testing. Therefore a critical care time was 25 minutes of direct attention to the patient's condition did not include time spent on procedures. PROCEDURES:   Procedures    None    Patient was given:  Medications   lactated ringers infusion 1,000 mL (0 mLs Intravenous Stopped 1/28/21 1417)   morphine (PF) injection 4 mg (4 mg Intravenous Given 1/28/21 1418)   iopamidol (ISOVUE-370) 76 % injection 75 mL (75 mLs Intravenous Given 1/28/21 1436)   methylnaltrexone (RELISTOR) injection 12 mg (12 mg Subcutaneous Given 1/28/21 1559)   morphine (PF) injection 4 mg (4 mg Intravenous Given 1/28/21 1559)   metoclopramide (REGLAN) injection 10 mg (10 mg Intravenous Given 1/28/21 1559)       Patient complains of right upper quadrant and epigastric abdominal pain. Patient states that he just had a cholecystectomy on January 26 by Dr. Ruth Dodd, he is coming in today with worsening abdominal pain associate with nausea but no vomiting or diarrhea. After evaluation and examination the patient IV access, blood work, pain medicine, nausea medicine and a CT scan of the abdomen were ordered. Urinalysis shows no significant infection, he does have glucose in his urine but he has had glucose in his urine before, this is not a new finding, no signs of infection. CBC shows no acute sepsis or anemia. Metabolic panel shows a very mild hyponatremia with a sodium of 129, glucose of 346, patient needs to take his diabetic medicine and check his blood sugars more closely. He was given fluids.   Liver functions have actually improved since his cholecystectomy including his alk phos ALT and AST however his total bilirubin is slightly elevated at 1.4, it was elevated higher at the beginning of this month. Lipase is normal.  CT the abdomen shows recent post laparoscopic findings noted in the upper medicine and related to the cholecystectomy, the amount of emphysema in the chest and abdomen wall and intraperitoneal area is expected at normal range. He has mild edema in the gallbladder fossa and periduodenal fat consistent with the recent surgery. No dilation of the common duct is appreciated. Chronic hypertrophy of the urinary bladder wall and mild to moderate hypertrophy of the prostate gland, they believe that this is not a new finding in addition, does not where the patient is having pain. Upon reevaluation his vital signs are stable, on his CT scan there is mild prominence of the stool in the colon, I do believe the patient probably has constipation related to his pain medicine use, we did try a dose of Relistor here in the emergency department. However also, I believe that the patient's symptoms are related to him being immobile, not wanting to move, I did educate him that he needs to start ambulating and being more active to help push out some of that air from the cholecystectomy as he needs to pass gas. I also educated him that he needs to only take pain medicine when he has pain however, we would place him on medicine to help with his constipation and GI tract. However, at this time of no concerns for acute surgical abdomen, no concerns for perforation, bowel obstruction, ileus or intussusception. Therefore, shared medical decision was made between the patient and myself and we agreed that the patient could be discharged home with outpatient follow-up. Patient was discharged home back to his PCP and general surgeon, educated to call in the morning and make an appointment to be seen.   Continue his home medicine as prescribed however to start Reglan MiraLAX and Zofran as prescribed. Return to the ER for worsening or concerning symptoms. The patient tolerated their visit well. I evaluated the patient. The physician was available for consultation as needed. The patient and / or the family were informed of the results of any tests, a time was given to answer questions, a plan was proposed and they agreed with plan. Patient verbalized understanding of discharge instructions and was discharged from the department in stable condition. CLINICAL IMPRESSION:  1. Abdominal pain, epigastric    2. Nausea without vomiting    3. Status post cholecystectomy    4. Constipation, unspecified constipation type        DISPOSITION Decision To Discharge 01/28/2021 04:54:11 PM      PATIENT REFERRED TO:  Lainey Melgar MD  [address] [place] [address], [address] [place] [address]  [phone]    Schedule an appointment as soon as possible for a visit in 1 day  Please follow-up with your surgeon in the next 2 days for reevaluation    [facility]  [address]  Go to   If symptoms worsen      DISCHARGE MEDICATIONS:  Discharge Medication List as of 1/28/2021  4:55 PM      START taking these medications    Details   metoclopramide (REGLAN) 10 MG tablet Take 1 tablet by mouth 4 times daily WARNING:  May cause drowsiness. May impair ability to operate vehicles or machinery. Do not use in combination with alcohol., Disp-20 tablet, R-0Print      polyethylene glycol (MIRALAX) 17 GM/SCOOP powder Take 17 g by mouth daily for 7 days PRN constipation, Disp-1 Bottle, R-0Print      !! ondansetron (ZOFRAN ODT) 4 MG disintegrating tablet Take 1 tablet by mouth every 8 hours as needed for Nausea, Disp-20 tablet, R-0Print       !! - Potential duplicate medications found. Please discuss with provider.           DISCONTINUED MEDICATIONS:  Discharge Medication List as of 1/28/2021  4:55 PM (Please note the MDM and HPI sections of this note were completed with a voice recognition program.  Efforts were made to edit the dictations but occasionally words are mis-transcribed.)    Electronically signed, COLTON Baldwin CNP,          COLTON Baldwin CNP  01/30/21 8621

## 2021-01-28 NOTE — TELEPHONE ENCOUNTER
Patient called the office c/o increase abd pain with nausea and vomiting. Recommend that he goes to the ER. Patient will come to [facility].

## 2021-01-31 DIAGNOSIS — Z90.49 S/P LAPAROSCOPIC CHOLECYSTECTOMY: Primary | ICD-10-CM

## 2021-01-31 RX ORDER — OXYCODONE HYDROCHLORIDE 5 MG/1
5 TABLET ORAL EVERY 6 HOURS PRN
Qty: 12 TABLET | Refills: 0 | Status: SHIPPED | OUTPATIENT
Start: 2021-01-31 | End: 2021-02-03

## 2021-02-01 ENCOUNTER — OFFICE VISIT (OUTPATIENT)
Dept: SURGERY | Age: 51
End: 2021-02-01

## 2021-02-01 ENCOUNTER — TELEPHONE (OUTPATIENT)
Dept: SURGERY | Age: 51
End: 2021-02-01

## 2021-02-01 VITALS
SYSTOLIC BLOOD PRESSURE: 136 MMHG | TEMPERATURE: 97.6 F | DIASTOLIC BLOOD PRESSURE: 88 MMHG | HEART RATE: 71 BPM | HEIGHT: 68 IN | BODY MASS INDEX: 28.04 KG/M2 | WEIGHT: 185 LBS

## 2021-02-01 DIAGNOSIS — Z90.49 S/P LAPAROSCOPIC CHOLECYSTECTOMY: Primary | ICD-10-CM

## 2021-02-01 PROCEDURE — 99024 POSTOP FOLLOW-UP VISIT: CPT | Performed by: SURGERY

## 2021-02-02 NOTE — PROGRESS NOTES
HPI: Nursing notes reviewed. Patient reports some swelling at upper incision. Pain. No fevers or chills. Eating ok. ROS:  10 point review of systems performed with pertinent positives in HPI    Phys:    Abd - soft, mild tender, nondistended,    Incisions - no erythema or fluctuance, no ecchymosis, minimal swelling upper incision    Assesment: 53 yo s/p laparoscopic cholecystectomy     Plan: 1. Doing well postop with mild pain and no nausea   2. No activity limitations   3. Low fat diet   4.   Discussed ibuprofen and ice for incisional pain

## 2021-02-07 ENCOUNTER — APPOINTMENT (OUTPATIENT)
Dept: CT IMAGING | Age: 51
End: 2021-02-07
Payer: OTHER GOVERNMENT

## 2021-02-07 ENCOUNTER — HOSPITAL ENCOUNTER (EMERGENCY)
Age: 51
Discharge: HOME | End: 2021-02-07
Attending: EMERGENCY MEDICINE
Payer: OTHER GOVERNMENT

## 2021-02-07 VITALS
HEIGHT: 68 IN | OXYGEN SATURATION: 96 % | DIASTOLIC BLOOD PRESSURE: 104 MMHG | WEIGHT: 185 LBS | BODY MASS INDEX: 28.04 KG/M2 | HEART RATE: 71 BPM | SYSTOLIC BLOOD PRESSURE: 152 MMHG | TEMPERATURE: 98.4 F | RESPIRATION RATE: 18 BRPM

## 2021-02-07 DIAGNOSIS — N30.00 ACUTE CYSTITIS WITHOUT HEMATURIA: ICD-10-CM

## 2021-02-07 DIAGNOSIS — R10.9 ABDOMINAL PAIN, UNSPECIFIED ABDOMINAL LOCATION: Primary | ICD-10-CM

## 2021-02-07 DIAGNOSIS — R74.01 TRANSAMINITIS: ICD-10-CM

## 2021-02-07 LAB
A/G RATIO: 1.1 (ref 1.1–2.2)
ALBUMIN SERPL-MCNC: 4 G/DL (ref 3.4–5)
ALP BLD-CCNC: 363 U/L (ref 40–129)
ALT SERPL-CCNC: 375 U/L (ref 10–40)
ANION GAP SERPL CALCULATED.3IONS-SCNC: 11 MMOL/L (ref 3–16)
AST SERPL-CCNC: 279 U/L (ref 15–37)
BASOPHILS ABSOLUTE: 0.1 K/UL (ref 0–0.2)
BASOPHILS RELATIVE PERCENT: 1.3 %
BILIRUB SERPL-MCNC: 0.8 MG/DL (ref 0–1)
BILIRUBIN URINE: NEGATIVE
BLOOD, URINE: NEGATIVE
BUN BLDV-MCNC: 13 MG/DL (ref 7–20)
CALCIUM SERPL-MCNC: 9 MG/DL (ref 8.3–10.6)
CHLORIDE BLD-SCNC: 96 MMOL/L (ref 99–110)
CLARITY: CLEAR
CO2: 27 MMOL/L (ref 21–32)
COLOR: YELLOW
CREAT SERPL-MCNC: 0.9 MG/DL (ref 0.9–1.3)
EOSINOPHILS ABSOLUTE: 0.7 K/UL (ref 0–0.6)
EOSINOPHILS RELATIVE PERCENT: 7.9 %
GFR AFRICAN AMERICAN: >60
GFR NON-AFRICAN AMERICAN: >60
GLOBULIN: 3.7 G/DL
GLUCOSE BLD-MCNC: 185 MG/DL (ref 70–99)
GLUCOSE URINE: NEGATIVE MG/DL
HCT VFR BLD CALC: 42.1 % (ref 40.5–52.5)
HEMOGLOBIN: 14.3 G/DL (ref 13.5–17.5)
KETONES, URINE: NEGATIVE MG/DL
LEUKOCYTE ESTERASE, URINE: NEGATIVE
LIPASE: 37 U/L (ref 13–60)
LYMPHOCYTES ABSOLUTE: 2 K/UL (ref 1–5.1)
LYMPHOCYTES RELATIVE PERCENT: 24.1 %
MCH RBC QN AUTO: 30.5 PG (ref 26–34)
MCHC RBC AUTO-ENTMCNC: 34 G/DL (ref 31–36)
MCV RBC AUTO: 89.7 FL (ref 80–100)
MICROSCOPIC EXAMINATION: ABNORMAL
MONOCYTES ABSOLUTE: 0.6 K/UL (ref 0–1.3)
MONOCYTES RELATIVE PERCENT: 6.9 %
NEUTROPHILS ABSOLUTE: 5 K/UL (ref 1.7–7.7)
NEUTROPHILS RELATIVE PERCENT: 59.8 %
NITRITE, URINE: NEGATIVE
PDW BLD-RTO: 14.5 % (ref 12.4–15.4)
PH UA: 6.5 (ref 5–8)
PLATELET # BLD: 401 K/UL (ref 135–450)
PMV BLD AUTO: 7.2 FL (ref 5–10.5)
POTASSIUM SERPL-SCNC: 3.8 MMOL/L (ref 3.5–5.1)
PROTEIN UA: NEGATIVE MG/DL
RBC # BLD: 4.69 M/UL (ref 4.2–5.9)
SODIUM BLD-SCNC: 134 MMOL/L (ref 136–145)
SPECIFIC GRAVITY UA: 1.01 (ref 1–1.03)
TOTAL PROTEIN: 7.7 G/DL (ref 6.4–8.2)
URINE REFLEX TO CULTURE: ABNORMAL
URINE TYPE: ABNORMAL
UROBILINOGEN, URINE: 4 E.U./DL
WBC # BLD: 8.3 K/UL (ref 4–11)

## 2021-02-07 PROCEDURE — 74177 CT ABD & PELVIS W/CONTRAST: CPT

## 2021-02-07 PROCEDURE — 87086 URINE CULTURE/COLONY COUNT: CPT

## 2021-02-07 PROCEDURE — 83690 ASSAY OF LIPASE: CPT

## 2021-02-07 PROCEDURE — 85025 COMPLETE CBC W/AUTO DIFF WBC: CPT

## 2021-02-07 PROCEDURE — 6370000000 HC RX 637 (ALT 250 FOR IP): Performed by: EMERGENCY MEDICINE

## 2021-02-07 PROCEDURE — 81003 URINALYSIS AUTO W/O SCOPE: CPT

## 2021-02-07 PROCEDURE — 80053 COMPREHEN METABOLIC PANEL: CPT

## 2021-02-07 PROCEDURE — 96375 TX/PRO/DX INJ NEW DRUG ADDON: CPT

## 2021-02-07 PROCEDURE — 2580000003 HC RX 258: Performed by: EMERGENCY MEDICINE

## 2021-02-07 PROCEDURE — 6360000004 HC RX CONTRAST MEDICATION: Performed by: EMERGENCY MEDICINE

## 2021-02-07 PROCEDURE — 96374 THER/PROPH/DIAG INJ IV PUSH: CPT

## 2021-02-07 PROCEDURE — 99285 EMERGENCY DEPT VISIT HI MDM: CPT

## 2021-02-07 PROCEDURE — 6360000002 HC RX W HCPCS: Performed by: EMERGENCY MEDICINE

## 2021-02-07 RX ORDER — PROMETHAZINE HYDROCHLORIDE 12.5 MG/1
12.5 TABLET ORAL 3 TIMES DAILY PRN
Qty: 12 TABLET | Refills: 0 | Status: SHIPPED | OUTPATIENT
Start: 2021-02-07 | End: 2021-02-14

## 2021-02-07 RX ORDER — CEFDINIR 300 MG/1
300 CAPSULE ORAL 2 TIMES DAILY
Qty: 14 CAPSULE | Refills: 0 | Status: SHIPPED | OUTPATIENT
Start: 2021-02-07 | End: 2021-02-14

## 2021-02-07 RX ORDER — OXYCODONE HYDROCHLORIDE 5 MG/1
5 TABLET ORAL ONCE
Status: COMPLETED | OUTPATIENT
Start: 2021-02-07 | End: 2021-02-07

## 2021-02-07 RX ORDER — ONDANSETRON 2 MG/ML
4 INJECTION INTRAMUSCULAR; INTRAVENOUS ONCE
Status: COMPLETED | OUTPATIENT
Start: 2021-02-07 | End: 2021-02-07

## 2021-02-07 RX ORDER — MORPHINE SULFATE 4 MG/ML
4 INJECTION, SOLUTION INTRAMUSCULAR; INTRAVENOUS ONCE
Status: COMPLETED | OUTPATIENT
Start: 2021-02-07 | End: 2021-02-07

## 2021-02-07 RX ORDER — 0.9 % SODIUM CHLORIDE 0.9 %
1000 INTRAVENOUS SOLUTION INTRAVENOUS ONCE
Status: COMPLETED | OUTPATIENT
Start: 2021-02-07 | End: 2021-02-07

## 2021-02-07 RX ORDER — PROMETHAZINE HYDROCHLORIDE 25 MG/ML
12.5 INJECTION, SOLUTION INTRAMUSCULAR; INTRAVENOUS ONCE
Status: COMPLETED | OUTPATIENT
Start: 2021-02-07 | End: 2021-02-07

## 2021-02-07 RX ADMIN — SODIUM CHLORIDE 1000 ML: 9 INJECTION, SOLUTION INTRAVENOUS at 11:25

## 2021-02-07 RX ADMIN — IOPAMIDOL 75 ML: 755 INJECTION, SOLUTION INTRAVENOUS at 13:20

## 2021-02-07 RX ADMIN — ONDANSETRON 4 MG: 2 INJECTION INTRAMUSCULAR; INTRAVENOUS at 11:25

## 2021-02-07 RX ADMIN — PROMETHAZINE HYDROCHLORIDE 12.5 MG: 25 INJECTION INTRAMUSCULAR; INTRAVENOUS at 12:08

## 2021-02-07 RX ADMIN — OXYCODONE 5 MG: 5 TABLET ORAL at 11:25

## 2021-02-07 RX ADMIN — MORPHINE SULFATE 4 MG: 4 INJECTION, SOLUTION INTRAMUSCULAR; INTRAVENOUS at 12:08

## 2021-02-07 RX ADMIN — OXYCODONE 5 MG: 5 TABLET ORAL at 14:13

## 2021-02-07 ASSESSMENT — PAIN SCALES - GENERAL
PAINLEVEL_OUTOF10: 10
PAINLEVEL_OUTOF10: 7
PAINLEVEL_OUTOF10: 10

## 2021-02-07 ASSESSMENT — PAIN DESCRIPTION - PAIN TYPE: TYPE: ACUTE PAIN

## 2021-02-07 NOTE — ED NOTES
Patient identified as a positive fall risk on the ED triage fall screening. Patient placed in fall precautions which includes:  yellow fall risk bracelet on wrist, non-skid shoes on feet, \"Be Safe\" sign placed on patient's door, and bed alarm placed under patient/alarm turned on. Patient instructed on importance of not getting out of bed or ambulating without assistance for safety.           Alona Piedra RN  02/07/21 3844

## 2021-02-07 NOTE — ED PROVIDER NOTES
[facility]  ED      CHIEF COMPLAINT  Abdominal Pain (Pt had Lap Lara 1/26 with Dr Kurt Huang. Abdominal pain and nausea started 3 days ago)       HISTORY OF PRESENT ILLNESS  Savanna Nova is a 52 y.o. male with history of recent cholecystectomy on January 26 with Dr. Jemma Astudillo who presents to the emergency department for evaluation of nausea, vomiting, and worsening abdominal pain. Patient reports having worsening abdominal pain over the past 2 to 3 days. Reports his pain is bilateral lower abdomen. It is keeping him up from sleeping. Reports trying oxycodone at home with some relief of symptoms. Reports having bowel movements. Had 3 bowel movements yesterday. He is passing gas. Reports having nausea but no vomiting. Patient reports the upper laparoscopic incision scar feels thickened compared to the other laparoscopic incisions. However, denies any drainage. Denies surrounding erythema or warmth. Denies any fevers or chills. Denies having hematochezia, melena, or hematuria. Reports having dysuria over the past 2 days. Is having normal urine output. Denies having testicular pain. However, says he thinks the left testicle appears to be riding higher than normal.    Per chart review, patient was seen in January 28 in the emergency department and discharged home with Zofran. CT abdomen pelvis with contrast obtained at that time showed recent post laparoscopic findings noted in the upper abdomen. There was emphysema in the chest abdomen bowel and intraperitoneal air within expected range. No other complaints, modifying factors or associated symptoms. I have reviewed the following from the nursing documentation.     Past Medical History:   Diagnosis Date    Diabetes mellitus ([address]     Hypercholesteremia     Hypertension     VANESSA (obstructive sleep apnea)     No CPAP    Thyroid cancer [facility])      Past Surgical History:   Procedure Laterality Date    APPENDECTOMY      CHOLECYSTECTOMY, LAPAROSCOPIC N/A 1/26/2021    LAPAROSCOPIC CHOLECYSTECTOMY WITH INTRAOPERATIVE CHOLANGIOGRAM, performed by Andrea Rae MD at [address] [place] [address]     Family History   Problem Relation Age of Onset    High Blood Pressure Mother     High Blood Pressure Father      Social History     Socioeconomic History    Marital status: Single     Spouse name: Not on file    Number of children: Not on file    Years of education: Not on file    Highest education level: Not on file   Occupational History    Not on file   Social Needs    Financial resource strain: Not on file    Food insecurity     Worry: Not on file     Inability: Not on file    Transportation needs     Medical: Not on file     Non-medical: Not on file   Tobacco Use    Smoking status: Former Smoker     Types: Cigarettes    Smokeless tobacco: Never Used    Tobacco comment: QUITE 15 YEARS AGO   Substance and Sexual Activity    Alcohol use: Not Currently     Comment: quit 6 years ago    Drug use: Never    Sexual activity: Not on file   Lifestyle    Physical activity     Days per week: Not on file     Minutes per session: Not on file    Stress: Not on file   Relationships    Social connections     Talks on phone: Not on file     Gets together: Not on file     Attends Baptism service: Not on file     Active member of club or organization: Not on file     Attends meetings of clubs or organizations: Not on file     Relationship status: Not on file    Intimate partner violence     Fear of current or ex partner: Not on file     Emotionally abused: Not on file     Physically abused: Not on file     Forced sexual activity: Not on file   Other Topics Concern    Not on file   Social History Narrative    Not on file     No current facility-administered medications for this encounter.       Current Outpatient Medications   Medication Sig Dispense Refill    promethazine (PHENERGAN) 12.5 MG tablet Take 1 tablet by mouth 3 times daily as needed for Nausea 12 tablet 0    cefdinir (OMNICEF) 300 MG capsule Take 1 capsule by mouth 2 times daily for 7 days 14 capsule 0    metoclopramide (REGLAN) 10 MG tablet Take 1 tablet by mouth 4 times daily WARNING:  May cause drowsiness. May impair ability to operate vehicles or machinery. Do not use in combination with alcohol. 20 tablet 0    ondansetron (ZOFRAN ODT) 4 MG disintegrating tablet Take 1 tablet by mouth every 8 hours as needed for Nausea 20 tablet 0    B Complex-C (SUPER B COMPLEX PO) Take 1 tablet by mouth daily      atorvastatin (LIPITOR) 80 MG tablet Take 40 mg by mouth nightly       FLUoxetine (PROZAC) 10 MG capsule Take 10 mg by mouth 2 times daily       pantoprazole (PROTONIX) 40 MG tablet Take 40 mg by mouth daily as needed       permethrin (ELIMITE) 5 % cream       insulin glargine (LANTUS) 100 UNIT/ML injection vial INJECT 20 UNITS UNDER THE SKIN DAILY AT BEDTIME FOR DIABETES. DISCARD 28 DAYS AFTER OPENING.  glucose-vitamin C 4-6 GM-MG CHEW chewable tablet CHEW AND SWALLOW FOUR TABLETS BY MOUTH  AS NEEDED FOR LOW BLOOD SUGAR.  diclofenac sodium (VOLTAREN) 1 % GEL APPLY 2 GRAMS TO UPPER EXTREMITIES TO AFFECTED AREA FOUR TIMES A DAY FOR LOW BACK PAIN. DO NOT APPLY MORE THAN 32 GRAMS PER 24 HOURS.  amphetamine-dextroamphetamine (ADDERALL XR) 30 MG extended release capsule Take 30 mg by mouth daily as needed.  ondansetron (ZOFRAN ODT) 4 MG disintegrating tablet Take 1 tablet by mouth every 8 hours as needed for Nausea 10 tablet 0    metFORMIN (GLUCOPHAGE) 500 MG tablet Take 1,000 mg by mouth 2 times daily (with meals)      Levothyroxine Sodium (SYNTHROID PO) Take 50 mcg by mouth every morning (before breakfast)       losartan (COZAAR) 25 MG tablet Take 75 mg by mouth daily afternoon       No Known Allergies    REVIEW OF SYSTEMS  10 systems reviewed, pertinent positives per HPI otherwise noted to be negative.     PHYSICAL EXAM  BP (!) 152/104   Pulse 71 Temp 98.4 °F (36.9 °C) (Oral)   Resp 18   Ht 5' 8\" (1.727 m)   Wt 185 lb (83.9 kg)   SpO2 96%   BMI 28.13 kg/m²    GENERAL APPEARANCE: Awake and alert. No acute distress. HENT: Normocephalic. Atraumatic. PERRL. EOMI. No facial droop. HEART/CHEST: RRR. LUNGS: Respirations unlabored. Speaking comfortably in full sentences. CTAB. ABDOMEN: Soft, non-distended abdomen. laparascopic incisions healing well with no surrounding erythema/fluctuance/drainage. Mild tender to palpation thorough bilateral lower abdomen. No guarding. No rebound. EXTREMITIES: no gross deformities. Moving all extremities. SKIN: Warm and dry. No acute rashes. NEUROLOGICAL: Alert and oriented. No gross facial drooping. Answering questions appropriately. Moving all extremities. PSYCHIATRIC: Pleasant. Normal mood and affect.     LABS  Results for orders placed or performed during the hospital encounter of 02/07/21   Culture, Urine    Specimen: Urine, clean catch   Result Value Ref Range    Urine Culture, Routine No growth at 18 to 36 hours    CBC Auto Differential   Result Value Ref Range    WBC 8.3 4.0 - 11.0 K/uL    RBC 4.69 4.20 - 5.90 M/uL    Hemoglobin 14.3 13.5 - 17.5 g/dL    Hematocrit 42.1 40.5 - 52.5 %    MCV 89.7 80.0 - 100.0 fL    MCH 30.5 26.0 - 34.0 pg    MCHC 34.0 31.0 - 36.0 g/dL    RDW 14.5 12.4 - 15.4 %    Platelets 219 664 - 227 K/uL    MPV 7.2 5.0 - 10.5 fL    Neutrophils % 59.8 %    Lymphocytes % 24.1 %    Monocytes % 6.9 %    Eosinophils % 7.9 %    Basophils % 1.3 %    Neutrophils Absolute 5.0 1.7 - 7.7 K/uL    Lymphocytes Absolute 2.0 1.0 - 5.1 K/uL    Monocytes Absolute 0.6 0.0 - 1.3 K/uL    Eosinophils Absolute 0.7 (H) 0.0 - 0.6 K/uL    Basophils Absolute 0.1 0.0 - 0.2 K/uL   Comprehensive Metabolic Panel   Result Value Ref Range    Sodium 134 (L) 136 - 145 mmol/L    Potassium 3.8 3.5 - 5.1 mmol/L    Chloride 96 (L) 99 - 110 mmol/L    CO2 27 21 - 32 mmol/L    Anion Gap 11 3 - 16    Glucose 185 (H) 70 - 99 mg/dL BUN 13 7 - 20 mg/dL    CREATININE 0.9 0.9 - 1.3 mg/dL    GFR Non-African American >60 >60    GFR African American >60 >60    Calcium 9.0 8.3 - 10.6 mg/dL    Total Protein 7.7 6.4 - 8.2 g/dL    Albumin 4.0 3.4 - 5.0 g/dL    Albumin/Globulin Ratio 1.1 1.1 - 2.2    Total Bilirubin 0.8 0.0 - 1.0 mg/dL    Alkaline Phosphatase 363 (H) 40 - 129 U/L     (H) 10 - 40 U/L     (H) 15 - 37 U/L    Globulin 3.7 g/dL   Lipase   Result Value Ref Range    Lipase 37.0 13.0 - 60.0 U/L   Urinalysis Reflex to Culture    Specimen: Urine, clean catch   Result Value Ref Range    Color, UA Yellow Straw/Yellow    Clarity, UA Clear Clear    Glucose, Ur Negative Negative mg/dL    Bilirubin Urine Negative Negative    Ketones, Urine Negative Negative mg/dL    Specific Gravity, UA 1.015 1.005 - 1.030    Blood, Urine Negative Negative    pH, UA 6.5 5.0 - 8.0    Protein, UA Negative Negative mg/dL    Urobilinogen, Urine 4.0 (A) <2.0 E.U./dL    Nitrite, Urine Negative Negative    Leukocyte Esterase, Urine Negative Negative    Microscopic Examination Not Indicated     Urine Type NotGiven     Urine Reflex to Culture Not Indicated        I have reviewed all labs for this visit. RADIOLOGY  Ct Abdomen Pelvis W Iv Contrast    Result Date: 2/7/2021  EXAMINATION: CT OF THE ABDOMEN AND PELVIS WITH CONTRAST 2/7/2021 1:08 pm TECHNIQUE: CT of the abdomen and pelvis was performed with the administration of intravenous contrast. Multiplanar reformatted images are provided for review. Dose modulation, iterative reconstruction, and/or weight based adjustment of the mA/kV was utilized to reduce the radiation dose to as low as reasonably achievable.  COMPARISON: 01/28/2021, 12/27/2020 HISTORY: ORDERING SYSTEM PROVIDED HISTORY: recent surgery. n/v. lower abdominal pain TECHNOLOGIST PROVIDED HISTORY: Reason for exam:->recent surgery. n/v. lower abdominal pain Decision Support Exception->Emergency Medical Condition (MA) Reason for Exam: lower abd pain x 3 days Acuity: Acute Type of Exam: Initial Additional signs and symptoms: nausea,diarrhea,burns when he urinates Relevant Medical/Surgical History: cholecystectomy FINDINGS: Lower Chest: There is a calcified granuloma within the right middle lobe. There is mild dependent atelectasis within both lower lobes. Organs: Patient is status post cholecystectomy, with stable mild intrahepatic and extrahepatic biliary ductal dilatation likely postsurgical in etiology. Liver is otherwise normal.  There are calcified splenic granuloma. There is an approximate 16 mm low-density lesion within the inferior spleen, likely either a cyst or hemangioma, similar to prior examinations. Pancreas is normal.  Adrenal glands are normal bilaterally. There is a stable 10 mm cyst within the left kidney upper pole. The bilateral kidneys are otherwise unremarkable, without evidence of inflammatory change, renal/ureteral calculus, or hydronephrosis. GI/Bowel: Evaluation of the hollow GI tract demonstrates a stable duodenal diverticulum. There is mild-to-moderate circumferential wall thickening of the distal transverse colon, splenic flexure, and proximal descending colon, which could be secondary to partial colonic collapse or possibly a focal infectious or inflammatory colitis. The remainder of the colon is unremarkable. There is colonic diverticulosis, though no evidence of diverticulitis. There remains mild dilatation of the terminal ileum similar to the prior exam, without evidence of bowel obstruction. Pelvis: There remains circumferential wall thickening of the urinary bladder, unchanged dating back to 12/27/2020. The prostate is stable. There is a mild amount of free pelvic fluid, likely postoperative in etiology. No pathologic pelvic lymphadenopathy is identified.  Peritoneum/Retroperitoneum: There are scattered locules of intraperitoneal free air identified, though degree of free air has decreased in amount in comparison with 01/28/2021, and is likely postoperative in etiology. No new abnormal intraperitoneal free air is evident. No intraperitoneal free fluid or fluid collection is identified. There is no evidence of a biloma or abscess. No pathologic lymphadenopathy is seen. The abdominal aorta is unremarkable. No significant abdominal wall hernia is evident. Bones/Soft Tissues: There is mild degenerative change throughout the thoracolumbar spine. No osteolytic or osteoblastic lesion is seen. 1. No definite acute process within the abdomen or pelvis. 2. Mild circumferential wall thickening involving the distal transverse colon, splenic flexure, and proximal descending colon, could represent partial colonic collapse, though a focal infectious or inflammatory colitis is also a diagnostic consideration. Clinical correlation is advised. 3. Patient is status post cholecystectomy, with mild intrahepatic and extrahepatic biliary ductal dilatation, most likely postoperative in etiology. There is no evidence of postoperative complication such as a biliary obstruction, biloma, or abscess. 4. Stable circumferential wall thickening of the urinary bladder, unchanged from prior exams, likely secondary to chronic urinary bladder outlet obstruction. However, a chronic underlying cystitis is also a diagnostic consideration. Ct Abdomen Pelvis W Iv Contrast Additional Contrast? None    Result Date: 1/28/2021  EXAMINATION: CT OF THE ABDOMEN AND PELVIS WITH CONTRAST 1/28/2021 2:27 pm TECHNIQUE: CT of the abdomen and pelvis was performed with the administration of intravenous contrast. Multiplanar reformatted images are provided for review. Dose modulation, iterative reconstruction, and/or weight based adjustment of the mA/kV was utilized to reduce the radiation dose to as low as reasonably achievable. COMPARISON: MRI abdomen, 12/30/2020.   CT abdomen and pelvis, 12/27/2020 HISTORY: ORDERING SYSTEM PROVIDED HISTORY: Status post cholecystectomy with nausea and vomiting TECHNOLOGIST PROVIDED HISTORY: Reason for exam:->Status post cholecystectomy with nausea and vomiting Additional Contrast?->None Reason for Exam: gb removed 1/26, upper abd pain increasing starting yesterday afternoon increasing today Acuity: Acute Type of Exam: Initial FINDINGS: Lower Chest: There is emphysema in the chest wall, greater on the right, consistent with recent surgery/laparoscopy. There are dense coronary artery calcifications. Bibasilar atelectasis is present. Organs: There are multiple speckles of gas in the upper abdomen, consistent with recent laparotomy. Cholecystectomy clips are noted. The gallbladder fossa fat and paraduodenal fat are infiltrated. There is mild prominence of the intrahepatic biliary ducts. No obvious dilation of the common duct or choledocholithiasis is appreciated. The pancreas, spleen, adrenal glands and kidneys are unremarkable. GI/Bowel: The stomach is unremarkable. There is a stable diverticulum with air-fluid level of the 2nd portion of the duodenum. No dilated loops of small bowel are seen. No focal mural thickening of the colon is identified. There is mild prominence of stool in the colon. There is normal enhancement of the mesenteric vasculature. Pelvis: The urinary bladder wall is thickened, measuring 9 mm anteriorly. There is mild-to-moderate hypertrophy of the prostate gland. No free fluid is seen. Peritoneum/Retroperitoneum: Pneumoperitoneum is again noted. There is no significant amount of free fluid appreciated. No localized fluid collection or abscess is identified. No adenopathy. No aneurysm. Bones/Soft Tissues: No acute osseous injury is appreciated. Healing periumbilical laparotomy site. Recent post laparoscopic findings are noted in the upper abdomen. The amount of emphysema in the chest/abdominal wall and intraperitoneal air is in the expected range.  Mild edema in the gallbladder fossa and paraduodenal fat, consistent with recent surgery. There is mild prominence of intrahepatic biliary ducts. However no dilation of the common duct is appreciated. Correlation with liver function tests would be helpful. Chronic hypertrophy of the urinary bladder wall and mild-to-moderate hypertrophy of the prostate gland. Bladder outlet stenosis is a consideration. Bibasilar atelectasis. Dense coronary artery calcification. Optimized medical management recommended. RECOMMENDATIONS: If there is persisting concern for bile leak, follow-up hepatic biliary scan would be considered. Fl Cholangiogram Or    Result Date: 1/26/2021  EXAMINATION: SPOT IMAGES FROM AN INTRAOPERATIVE CHOLANGIOGRAM 1/26/2021 1:26 pm COMPARISON: None. HISTORY: ORDERING SYSTEM PROVIDED HISTORY: Abdominal pain, generalized TECHNOLOGIST PROVIDED HISTORY: Reason for Exam: pain Acuity: Acute Type of Exam: Initial FLUOROSCOPY DOSE AND TYPE OR TIME AND EXPOSURES: 0.2 minutes or 9 seconds. Cumulative air Kerma 2.005 mGy. 1 fluoroscopic image and 1 short video loops of fluoroscopic images. FINDINGS: Common duct is normal in size and is normally opacified. No filling defect present. Free flow of contrast into the duodenum occurs. No extravasation. Normal intraoperative laparoscopic cholangiogram.  No common duct stone. ED COURSE/MDM  Patient seen and evaluated. At presentation, patient was awake, alert, afebrile, hemodynamically stable, and satting well on room air. Exam remarkable for well-healing laparoscopic incisions with no surrounding erythema, purulence, or warmth. Patient had tenderness to palpation throughout all 4 quadrants without guarding or rebound. Abdomen was otherwise soft and nondistended.  exam normal. No tenderness to palpation over the testicles. No inguinal hernias present.  Differential diagnosis includes expected postop pain, urinary tract infection, nephrolithiasis, pyelonephritis, small bowel obstruction, among 2. Transaminitis    3. Acute cystitis without hematuria        Blood pressure (!) 152/104, pulse 71, temperature 98.4 °F (36.9 °C), temperature source Oral, resp. rate 18, height 5' 8\" (1.727 m), weight 185 lb (83.9 kg), SpO2 96 %. DISPOSITION  Radha Edward was discharged home in stable condition. Patient was given scripts for the following medications. I counseled patient how to take these medications. Discharge Medication List as of 2/7/2021  1:58 PM      START taking these medications    Details   promethazine (PHENERGAN) 12.5 MG tablet Take 1 tablet by mouth 3 times daily as needed for Nausea, Disp-12 tablet, R-0Print      cefdinir (OMNICEF) 300 MG capsule Take 1 capsule by mouth 2 times daily for 7 days, Disp-14 capsule, R-0Print             Follow-up with:  Jaye Gonzalez  [phone]  In 2 days        DISCLAIMER: This chart was created using Dragon dictation software. Efforts were made by me to ensure accuracy, however some errors may be present due to limitations of this technology and occasionally words are not transcribed correctly.        Audra Marcano MD  02/08/21 0116

## 2021-02-08 LAB — URINE CULTURE, ROUTINE: NORMAL

## 2022-01-20 PROBLEM — K92.2 GI BLEED: Status: ACTIVE | Noted: 2022-01-20

## (undated) DEVICE — GLOVE,SURG,SENSICARE SLT,LF,PF,7: Brand: MEDLINE

## (undated) DEVICE — GAUZE,SPONGE,2"X2",8PLY,STERILE,LF,2'S: Brand: MEDLINE

## (undated) DEVICE — Z INACTIVE USE 2641839 CLIP INT M L POLYMER LOK LIG HEM O LOK

## (undated) DEVICE — LAPAROSCOPIC CHOLANGIOGRAM CATHETER: Brand: AMERICAN CATHETER CORP

## (undated) DEVICE — Device

## (undated) DEVICE — PUMP SUC IRR TBNG L10FT W/ HNDPC ASSEMB STRYKEFLOW 2

## (undated) DEVICE — SUTURE VCRL + SZ 3-0 L18IN ABSRB UD SH 1/2 CIR TAPERCUT NDL VCP864D

## (undated) DEVICE — SOLUTION IV 1000ML LAC RINGERS PH 6.5 INJ USP VIAFLX PLAS

## (undated) DEVICE — [HIGH FLOW INSUFFLATOR,  DO NOT USE IF PACKAGE IS DAMAGED,  KEEP DRY,  KEEP AWAY FROM SUNLIGHT,  PROTECT FROM HEAT AND RADIOACTIVE SOURCES.]: Brand: PNEUMOSURE